# Patient Record
Sex: FEMALE | Race: WHITE | NOT HISPANIC OR LATINO | Employment: PART TIME | URBAN - METROPOLITAN AREA
[De-identification: names, ages, dates, MRNs, and addresses within clinical notes are randomized per-mention and may not be internally consistent; named-entity substitution may affect disease eponyms.]

---

## 2021-04-13 ENCOUNTER — HOSPITAL ENCOUNTER (EMERGENCY)
Age: 31
Discharge: HOME | End: 2021-04-13
Attending: EMERGENCY MEDICINE

## 2021-04-13 ENCOUNTER — APPOINTMENT (OUTPATIENT)
Dept: ULTRASOUND IMAGING | Age: 31
End: 2021-04-13
Attending: EMERGENCY MEDICINE

## 2021-04-13 VITALS
OXYGEN SATURATION: 100 % | WEIGHT: 136.24 LBS | HEART RATE: 65 BPM | DIASTOLIC BLOOD PRESSURE: 66 MMHG | TEMPERATURE: 97.9 F | RESPIRATION RATE: 16 BRPM | SYSTOLIC BLOOD PRESSURE: 125 MMHG

## 2021-04-13 DIAGNOSIS — R10.2 PELVIC PAIN IN FEMALE: Primary | ICD-10-CM

## 2021-04-13 LAB
ALBUMIN SERPL-MCNC: 4.1 G/DL (ref 3.6–5.1)
ALBUMIN/GLOB SERPL: 1.5 {RATIO} (ref 1–2.4)
ALP SERPL-CCNC: 30 UNITS/L (ref 45–117)
ALT SERPL-CCNC: 56 UNITS/L
ANION GAP SERPL CALC-SCNC: 8 MMOL/L (ref 10–20)
AST SERPL-CCNC: 110 UNITS/L
BASOPHILS # BLD: 0.1 K/MCL (ref 0–0.3)
BASOPHILS NFR BLD: 1 %
BILIRUB SERPL-MCNC: 0.3 MG/DL (ref 0.2–1)
BUN SERPL-MCNC: 15 MG/DL (ref 6–20)
BUN/CREAT SERPL: 17 (ref 7–25)
CALCIUM SERPL-MCNC: 8.7 MG/DL (ref 8.4–10.2)
CHLORIDE SERPL-SCNC: 109 MMOL/L (ref 98–107)
CLUE CELLS VAG QL WET PREP: NORMAL
CO2 SERPL-SCNC: 27 MMOL/L (ref 21–32)
CREAT SERPL-MCNC: 0.87 MG/DL (ref 0.51–0.95)
DEPRECATED RDW RBC: 41 FL (ref 39–50)
EOSINOPHIL # BLD: 0.1 K/MCL (ref 0–0.5)
EOSINOPHIL NFR BLD: 1 %
ERYTHROCYTE [DISTWIDTH] IN BLOOD: 11.7 % (ref 11–15)
FASTING DURATION TIME PATIENT: ABNORMAL H
GFR SERPLBLD BASED ON 1.73 SQ M-ARVRAT: 90 ML/MIN/1.73M2
GLOBULIN SER-MCNC: 2.8 G/DL (ref 2–4)
GLUCOSE SERPL-MCNC: 101 MG/DL (ref 65–99)
HCG UR QL: NEGATIVE
HCT VFR BLD CALC: 38.2 % (ref 36–46.5)
HGB BLD-MCNC: 12.8 G/DL (ref 12–15.5)
IMM GRANULOCYTES # BLD AUTO: 0 K/MCL (ref 0–0.2)
IMM GRANULOCYTES # BLD: 0 %
LYMPHOCYTES # BLD: 3.4 K/MCL (ref 1–4.8)
LYMPHOCYTES NFR BLD: 41 %
MCH RBC QN AUTO: 32 PG (ref 26–34)
MCHC RBC AUTO-ENTMCNC: 33.5 G/DL (ref 32–36.5)
MCV RBC AUTO: 95.5 FL (ref 78–100)
MONOCYTES # BLD: 0.6 K/MCL (ref 0.3–0.9)
MONOCYTES NFR BLD: 7 %
NEUTROPHILS # BLD: 4.2 K/MCL (ref 1.8–7.7)
NEUTROPHILS NFR BLD: 50 %
NRBC BLD MANUAL-RTO: 0 /100 WBC
PLATELET # BLD AUTO: 209 K/MCL (ref 140–450)
POTASSIUM SERPL-SCNC: 3.7 MMOL/L (ref 3.4–5.1)
PROT SERPL-MCNC: 6.9 G/DL (ref 6.4–8.2)
RBC # BLD: 4 MIL/MCL (ref 4–5.2)
SODIUM SERPL-SCNC: 140 MMOL/L (ref 135–145)
T VAGINALIS VAG QL WET PREP: NORMAL
TSH SERPL-ACNC: 2.64 MCUNITS/ML (ref 0.35–5)
WBC # BLD: 8.4 K/MCL (ref 4.2–11)
YEAST VAG QL WET PREP: NORMAL

## 2021-04-13 PROCEDURE — 87210 SMEAR WET MOUNT SALINE/INK: CPT | Performed by: PHYSICIAN ASSISTANT

## 2021-04-13 PROCEDURE — 87491 CHLMYD TRACH DNA AMP PROBE: CPT | Performed by: PHYSICIAN ASSISTANT

## 2021-04-13 PROCEDURE — 84443 ASSAY THYROID STIM HORMONE: CPT | Performed by: PHYSICIAN ASSISTANT

## 2021-04-13 PROCEDURE — 85025 COMPLETE CBC W/AUTO DIFF WBC: CPT | Performed by: EMERGENCY MEDICINE

## 2021-04-13 PROCEDURE — 84703 CHORIONIC GONADOTROPIN ASSAY: CPT

## 2021-04-13 PROCEDURE — 80053 COMPREHEN METABOLIC PANEL: CPT | Performed by: EMERGENCY MEDICINE

## 2021-04-13 PROCEDURE — 76856 US EXAM PELVIC COMPLETE: CPT

## 2021-04-13 PROCEDURE — 99284 EMERGENCY DEPT VISIT MOD MDM: CPT

## 2021-04-13 PROCEDURE — 76830 TRANSVAGINAL US NON-OB: CPT

## 2021-04-13 PROCEDURE — 36415 COLL VENOUS BLD VENIPUNCTURE: CPT

## 2021-04-13 ASSESSMENT — ENCOUNTER SYMPTOMS
BACK PAIN: 0
HEADACHES: 0
FEVER: 0
SHORTNESS OF BREATH: 0
ABDOMINAL PAIN: 1

## 2021-04-13 ASSESSMENT — PAIN SCALES - GENERAL: PAINLEVEL_OUTOF10: 4

## 2021-04-14 LAB
C TRACH RRNA CVX QL NAA+PROBE: NEGATIVE
Lab: NORMAL
N GONORRHOEA RRNA CVX QL NAA+PROBE: NEGATIVE
RAINBOW EXTRA TUBES HOLD SPECIMEN: NORMAL

## 2021-04-15 ENCOUNTER — OFFICE VISIT (OUTPATIENT)
Dept: OBGYN | Age: 31
End: 2021-04-15

## 2021-04-15 VITALS
WEIGHT: 137.2 LBS | DIASTOLIC BLOOD PRESSURE: 79 MMHG | SYSTOLIC BLOOD PRESSURE: 122 MMHG | HEIGHT: 66 IN | HEART RATE: 71 BPM | BODY MASS INDEX: 22.05 KG/M2

## 2021-04-15 DIAGNOSIS — Z30.432 ENCOUNTER FOR IUD REMOVAL: Primary | ICD-10-CM

## 2021-04-15 PROCEDURE — 58301 REMOVE INTRAUTERINE DEVICE: CPT | Performed by: LEGAL MEDICINE

## 2021-04-15 PROCEDURE — 99202 OFFICE O/P NEW SF 15 MIN: CPT | Performed by: LEGAL MEDICINE

## 2021-10-24 ENCOUNTER — WALK IN (OUTPATIENT)
Dept: URGENT CARE | Age: 31
End: 2021-10-24
Attending: FAMILY MEDICINE

## 2021-10-24 VITALS
DIASTOLIC BLOOD PRESSURE: 71 MMHG | WEIGHT: 128 LBS | TEMPERATURE: 97.5 F | BODY MASS INDEX: 20.66 KG/M2 | HEART RATE: 72 BPM | RESPIRATION RATE: 16 BRPM | OXYGEN SATURATION: 97 % | SYSTOLIC BLOOD PRESSURE: 113 MMHG

## 2021-10-24 DIAGNOSIS — S60.351A FOREIGN BODY OF RIGHT THUMB, INITIAL ENCOUNTER: Primary | ICD-10-CM

## 2021-10-24 PROCEDURE — 64450 NJX AA&/STRD OTHER PN/BRANCH: CPT

## 2021-10-24 PROCEDURE — 99212 OFFICE O/P EST SF 10 MIN: CPT

## 2021-10-24 PROCEDURE — 10002801 HB RX 250 W/O HCPCS: Performed by: FAMILY MEDICINE

## 2021-10-24 RX ORDER — LIDOCAINE HYDROCHLORIDE 10 MG/ML
100 INJECTION, SOLUTION EPIDURAL; INFILTRATION; INTRACAUDAL; PERINEURAL ONCE
Status: COMPLETED | OUTPATIENT
Start: 2021-10-24 | End: 2021-10-24

## 2021-10-24 RX ORDER — CEFADROXIL 500 MG/1
500 CAPSULE ORAL 2 TIMES DAILY
Qty: 14 CAPSULE | Refills: 0 | Status: SHIPPED | OUTPATIENT
Start: 2021-10-24 | End: 2021-10-31

## 2021-10-24 RX ADMIN — LIDOCAINE HYDROCHLORIDE 100 MG: 10 INJECTION, SOLUTION EPIDURAL; INFILTRATION; INTRACAUDAL; PERINEURAL at 10:57

## 2021-10-24 ASSESSMENT — PAIN SCALES - GENERAL
PAINLEVEL: 4
PAINLEVEL_OUTOF10: 4

## 2021-10-25 ASSESSMENT — ENCOUNTER SYMPTOMS
VOMITING: 0
NAUSEA: 0
ABDOMINAL PAIN: 0
NUMBNESS: 0
WEAKNESS: 0
FEVER: 0
ADENOPATHY: 0

## 2023-05-17 ENCOUNTER — TELEPHONE (OUTPATIENT)
Dept: OBGYN | Age: 33
End: 2023-05-17

## 2023-05-18 ENCOUNTER — OFFICE VISIT (OUTPATIENT)
Dept: OBGYN | Age: 33
End: 2023-05-18

## 2023-05-18 VITALS
HEART RATE: 71 BPM | BODY MASS INDEX: 19.37 KG/M2 | SYSTOLIC BLOOD PRESSURE: 120 MMHG | DIASTOLIC BLOOD PRESSURE: 78 MMHG | WEIGHT: 120 LBS

## 2023-05-18 DIAGNOSIS — N89.8 HYMENAL REMNANT: Primary | ICD-10-CM

## 2023-05-18 PROCEDURE — 99214 OFFICE O/P EST MOD 30 MIN: CPT | Performed by: OBSTETRICS & GYNECOLOGY

## 2023-10-14 ENCOUNTER — OFFICE VISIT (OUTPATIENT)
Dept: FAMILY MEDICINE CLINIC | Facility: CLINIC | Age: 33
End: 2023-10-14
Payer: MEDICAID

## 2023-10-14 ENCOUNTER — APPOINTMENT (OUTPATIENT)
Dept: GENERAL RADIOLOGY | Age: 33
End: 2023-10-14
Attending: EMERGENCY MEDICINE

## 2023-10-14 ENCOUNTER — HOSPITAL ENCOUNTER (EMERGENCY)
Age: 33
Discharge: HOME | End: 2023-10-14
Attending: EMERGENCY MEDICINE

## 2023-10-14 VITALS
SYSTOLIC BLOOD PRESSURE: 110 MMHG | BODY MASS INDEX: 20.09 KG/M2 | TEMPERATURE: 99 F | RESPIRATION RATE: 16 BRPM | HEIGHT: 66 IN | OXYGEN SATURATION: 99 % | HEART RATE: 67 BPM | WEIGHT: 125 LBS | DIASTOLIC BLOOD PRESSURE: 72 MMHG

## 2023-10-14 VITALS
OXYGEN SATURATION: 100 % | WEIGHT: 134.04 LBS | HEIGHT: 66 IN | HEART RATE: 68 BPM | TEMPERATURE: 98 F | RESPIRATION RATE: 16 BRPM | BODY MASS INDEX: 21.54 KG/M2 | SYSTOLIC BLOOD PRESSURE: 106 MMHG | DIASTOLIC BLOOD PRESSURE: 66 MMHG

## 2023-10-14 DIAGNOSIS — R55 SYNCOPE, UNSPECIFIED SYNCOPE TYPE: Primary | ICD-10-CM

## 2023-10-14 DIAGNOSIS — R55 SYNCOPE AND COLLAPSE: Primary | ICD-10-CM

## 2023-10-14 LAB
ALBUMIN SERPL-MCNC: 4.1 G/DL (ref 3.6–5.1)
ALBUMIN/GLOB SERPL: 1.5 {RATIO} (ref 1–2.4)
ALP SERPL-CCNC: 37 UNITS/L (ref 45–117)
ALT SERPL-CCNC: 19 UNITS/L
ANION GAP SERPL CALC-SCNC: 8 MMOL/L (ref 7–19)
AST SERPL-CCNC: 14 UNITS/L
BASOPHILS # BLD: 0.1 K/MCL (ref 0–0.3)
BASOPHILS NFR BLD: 1 %
BILIRUB SERPL-MCNC: 0.4 MG/DL (ref 0.2–1)
BUN SERPL-MCNC: 11 MG/DL (ref 6–20)
BUN/CREAT SERPL: 15 (ref 7–25)
CALCIUM SERPL-MCNC: 8.5 MG/DL (ref 8.4–10.2)
CHLORIDE SERPL-SCNC: 107 MMOL/L (ref 97–110)
CO2 SERPL-SCNC: 29 MMOL/L (ref 21–32)
CREAT SERPL-MCNC: 0.74 MG/DL (ref 0.51–0.95)
DEPRECATED RDW RBC: 42.3 FL (ref 39–50)
EGFRCR SERPLBLD CKD-EPI 2021: >90 ML/MIN/{1.73_M2}
EOSINOPHIL # BLD: 0 K/MCL (ref 0–0.5)
EOSINOPHIL NFR BLD: 0 %
ERYTHROCYTE [DISTWIDTH] IN BLOOD: 11.9 % (ref 11–15)
FASTING DURATION TIME PATIENT: ABNORMAL H
GLOBULIN SER-MCNC: 2.8 G/DL (ref 2–4)
GLUCOSE SERPL-MCNC: 102 MG/DL (ref 70–99)
HCG UR QL: NEGATIVE
HCT VFR BLD CALC: 39 % (ref 36–46.5)
HGB BLD-MCNC: 12.9 G/DL (ref 12–15.5)
IMM GRANULOCYTES # BLD AUTO: 0 K/MCL (ref 0–0.2)
IMM GRANULOCYTES # BLD: 0 %
LYMPHOCYTES # BLD: 2.5 K/MCL (ref 1–4.8)
LYMPHOCYTES NFR BLD: 28 %
MAGNESIUM SERPL-MCNC: 2.1 MG/DL (ref 1.7–2.4)
MCH RBC QN AUTO: 31.9 PG (ref 26–34)
MCHC RBC AUTO-ENTMCNC: 33.1 G/DL (ref 32–36.5)
MCV RBC AUTO: 96.5 FL (ref 78–100)
MONOCYTES # BLD: 0.5 K/MCL (ref 0.3–0.9)
MONOCYTES NFR BLD: 5 %
NEUTROPHILS # BLD: 6.1 K/MCL (ref 1.8–7.7)
NEUTROPHILS NFR BLD: 66 %
NRBC BLD MANUAL-RTO: 0 /100 WBC
PLATELET # BLD AUTO: 162 K/MCL (ref 140–450)
POTASSIUM SERPL-SCNC: 3.6 MMOL/L (ref 3.4–5.1)
PROT SERPL-MCNC: 6.9 G/DL (ref 6.4–8.2)
RAINBOW EXTRA TUBES HOLD SPECIMEN: NORMAL
RAINBOW EXTRA TUBES HOLD SPECIMEN: NORMAL
RBC # BLD: 4.04 MIL/MCL (ref 4–5.2)
SODIUM SERPL-SCNC: 140 MMOL/L (ref 135–145)
TROPONIN I SERPL DL<=0.01 NG/ML-MCNC: <4 NG/L
WBC # BLD: 9.2 K/MCL (ref 4.2–11)

## 2023-10-14 PROCEDURE — 80053 COMPREHEN METABOLIC PANEL: CPT | Performed by: EMERGENCY MEDICINE

## 2023-10-14 PROCEDURE — 36415 COLL VENOUS BLD VENIPUNCTURE: CPT

## 2023-10-14 PROCEDURE — 85025 COMPLETE CBC W/AUTO DIFF WBC: CPT | Performed by: EMERGENCY MEDICINE

## 2023-10-14 PROCEDURE — 84484 ASSAY OF TROPONIN QUANT: CPT | Performed by: EMERGENCY MEDICINE

## 2023-10-14 PROCEDURE — 93005 ELECTROCARDIOGRAM TRACING: CPT | Performed by: EMERGENCY MEDICINE

## 2023-10-14 PROCEDURE — 84703 CHORIONIC GONADOTROPIN ASSAY: CPT

## 2023-10-14 PROCEDURE — 10002807 HB RX 258: Performed by: EMERGENCY MEDICINE

## 2023-10-14 PROCEDURE — 71045 X-RAY EXAM CHEST 1 VIEW: CPT

## 2023-10-14 PROCEDURE — 99284 EMERGENCY DEPT VISIT MOD MDM: CPT

## 2023-10-14 PROCEDURE — 83735 ASSAY OF MAGNESIUM: CPT | Performed by: EMERGENCY MEDICINE

## 2023-10-14 RX ADMIN — SODIUM CHLORIDE 1000 ML: 9 INJECTION, SOLUTION INTRAVENOUS at 14:08

## 2023-10-14 ASSESSMENT — ENCOUNTER SYMPTOMS
VOMITING: 0
ABDOMINAL PAIN: 0
FACIAL ASYMMETRY: 0
FEVER: 0
WEAKNESS: 0
NAUSEA: 0
DIARRHEA: 0
HEADACHES: 0
CHILLS: 0
NUMBNESS: 0
SEIZURES: 0
SPEECH DIFFICULTY: 0
SHORTNESS OF BREATH: 0
DIZZINESS: 0
LIGHT-HEADEDNESS: 1
TREMORS: 0

## 2023-10-14 ASSESSMENT — PAIN SCALES - GENERAL: PAINLEVEL_OUTOF10: 0

## 2023-10-15 LAB
ATRIAL RATE (BPM): 72
P AXIS (DEGREES): 32
PR-INTERVAL (MSEC): 132
QRS-INTERVAL (MSEC): 88
QT-INTERVAL (MSEC): 410
QTC: 449
R AXIS (DEGREES): 68
REPORT TEXT: NORMAL
T AXIS (DEGREES): 39
VENTRICULAR RATE EKG/MIN (BPM): 72

## 2024-03-12 ENCOUNTER — OFFICE VISIT (OUTPATIENT)
Dept: FAMILY MEDICINE CLINIC | Facility: CLINIC | Age: 34
End: 2024-03-12

## 2024-03-12 ENCOUNTER — OFFICE VISIT (OUTPATIENT)
Dept: FAMILY MEDICINE CLINIC | Facility: CLINIC | Age: 34
End: 2024-03-12
Payer: COMMERCIAL

## 2024-03-12 VITALS
OXYGEN SATURATION: 98 % | HEIGHT: 66 IN | DIASTOLIC BLOOD PRESSURE: 68 MMHG | SYSTOLIC BLOOD PRESSURE: 104 MMHG | BODY MASS INDEX: 20.57 KG/M2 | RESPIRATION RATE: 16 BRPM | TEMPERATURE: 98 F | HEART RATE: 67 BPM | WEIGHT: 128 LBS

## 2024-03-12 DIAGNOSIS — Z23 NEED FOR TDAP VACCINATION: Primary | ICD-10-CM

## 2024-03-12 DIAGNOSIS — Z02.1 PHYSICAL EXAM, PRE-EMPLOYMENT: Primary | ICD-10-CM

## 2024-03-12 DIAGNOSIS — Z11.1 PPD SCREENING TEST: ICD-10-CM

## 2024-03-12 PROCEDURE — 86580 TB INTRADERMAL TEST: CPT | Performed by: NURSE PRACTITIONER

## 2024-03-12 PROCEDURE — 90715 TDAP VACCINE 7 YRS/> IM: CPT | Performed by: NURSE PRACTITIONER

## 2024-03-12 PROCEDURE — 99395 PREV VISIT EST AGE 18-39: CPT | Performed by: NURSE PRACTITIONER

## 2024-03-12 PROCEDURE — 90471 IMMUNIZATION ADMIN: CPT | Performed by: NURSE PRACTITIONER

## 2024-03-12 NOTE — PATIENT INSTRUCTIONS
You will need to return to clinic in 48-72 hours to have results of TB test read.     Please return to clinic on 3/14/24 after 9:00AM or on 3/15/24 before 9:00AM to have your TB test read.    If you do not return during this time your test will need to be repeated.     Our clinic hours are:  Monday-Friday        8:00 am to 7:30 pm  Saturday/Sunday    9:00 am to 4:30 pm    You can go to any of the [facility] Walk-In Clinics to have your TB test read.  To find your nearest Walk-In Clinic go to [email or web address]

## 2024-03-12 NOTE — PROGRESS NOTES
CHIEF COMPLAINT:         HPI:   Desi Rodriguez is a 33 year old female who presents for a complete physical exam.     Patient concerns: none.  Activity tolerance: normal      No current outpatient medications on file.      History reviewed. No pertinent past medical history.   History reviewed. No pertinent surgical history.   No family history on file.   Social History:  Social History     Socioeconomic History    Marital status: Single   Tobacco Use    Smoking status: Former     Types: Cigarettes    Smokeless tobacco: Never   Vaping Use    Vaping Use: Some days      Occ: . : single. Children: 1.   Exercise:  7 times per week.  Diet: watches calories closely     REVIEW OF SYSTEMS:   GENERAL: Feels well otherwise  SKIN: denies any unusual skin lesions  EYES:denies blurred vision or double vision  HEENT: denies nasal congestion, sinus pain or ST  LUNGS: denies shortness of breath at rest on on exertion  CARDIOVASCULAR: denies chest pain or palpitations   GI: denies abdominal pain or heartburn.  No N/V/C/D.  : denies vaginal discharge, dysuria, suprapubic pain.   MUSCULOSKELETAL: denies back pain or other joint pain  NEURO: denies headaches  PSYCHE: denies depression or anxiety  HEMATOLOGIC: denies hx of anemia or other bleeding disorders  ENDOCRINE: denies thyroid history  ALLERGY/ASTHMA: denies hx of seasonal allergies or asthma    EXAM:   /68   Pulse 67   Temp 97.7 °F (36.5 °C)   Resp 16   Ht 5' 6\" (1.676 m)   Wt 128 lb (58.1 kg)   LMP 03/01/2024   SpO2 98%   BMI 20.66 kg/m²   Body mass index is 20.66 kg/m².     GENERAL: well developed, well nourished,in no apparent distress  SKIN: no rashes, no suspicious lesions  HEENT: atraumatic, normocephalic,ears and throat are clear  EYES:PERRLA, EOMI, conjunctiva are clear  NECK: supple,no adenopathy,no bruits  CHEST: no chest tenderness  LUNGS: Clear to auscultation bilaterally. No diminished breath sounds. No wheezing, rhonchi,  or rales.  CARDIO: RRR without murmur  GI: BS's present x4., No tenderness of palpation.  No obvious masses or palpable organomegaly   MUSCULOSKELETAL: back is not tender, ROM of the back fully intact  EXTREMITIES: no cyanosis, clubbing or edema  NEURO: Alert & Oriented x3. Cranial nerves are grossly intact.       ASSESSMENT AND PLAN:   Desi oRdriguez is a 33 year old female who presents for a pre employment  physical exam. Patient's current BMI is Body mass index is 20.66 kg/m²..      ASSESSMENT: 1. Physical exam, pre-employment        PLAN: Encouraged patient to have full physical with health maintenance labs done by PCP within this year.  Nutrition counseling provided.     Form filled out and given to patient.  Form was copied and sent to scanning.     Patient's questions/concerns were addressed and answered. Patient is in agreement with treatment plan.     Patient is to follow up as needed with PCP or here in clinic.

## 2024-03-12 NOTE — PROGRESS NOTES
Here for Tdap and Tb screening.  She will get her vaccine record for her MMR      See TB screening flowsheet.  Tb placed.      Vaccination Screening Questionnaire filled out by patient and reviewed by myself.  No contraindications to vaccination.  Vaccination information sheet given to patient. Side effects and risks of vaccination explained and discussed.  Patient voiced understanding and agreed to proceed with vaccination.  Pt tolerated vaccine well.

## 2024-03-13 ENCOUNTER — OFFICE VISIT (OUTPATIENT)
Dept: FAMILY MEDICINE CLINIC | Facility: CLINIC | Age: 34
End: 2024-03-13
Payer: COMMERCIAL

## 2024-03-13 VITALS
DIASTOLIC BLOOD PRESSURE: 70 MMHG | RESPIRATION RATE: 16 BRPM | BODY MASS INDEX: 21.38 KG/M2 | TEMPERATURE: 97 F | WEIGHT: 133 LBS | SYSTOLIC BLOOD PRESSURE: 108 MMHG | HEART RATE: 66 BPM | HEIGHT: 66 IN

## 2024-03-13 DIAGNOSIS — R42 ORTHOSTATIC DIZZINESS: ICD-10-CM

## 2024-03-13 DIAGNOSIS — R55 SYNCOPE AND COLLAPSE: Primary | ICD-10-CM

## 2024-03-13 DIAGNOSIS — Z82.49 FAMILY HISTORY OF CARDIOMYOPATHY: ICD-10-CM

## 2024-03-13 DIAGNOSIS — Z01.84 IMMUNITY STATUS TESTING: ICD-10-CM

## 2024-03-13 DIAGNOSIS — R42 DIZZINESS: ICD-10-CM

## 2024-03-13 PROCEDURE — 3074F SYST BP LT 130 MM HG: CPT | Performed by: STUDENT IN AN ORGANIZED HEALTH CARE EDUCATION/TRAINING PROGRAM

## 2024-03-13 PROCEDURE — 99204 OFFICE O/P NEW MOD 45 MIN: CPT | Performed by: STUDENT IN AN ORGANIZED HEALTH CARE EDUCATION/TRAINING PROGRAM

## 2024-03-13 PROCEDURE — 3078F DIAST BP <80 MM HG: CPT | Performed by: STUDENT IN AN ORGANIZED HEALTH CARE EDUCATION/TRAINING PROGRAM

## 2024-03-13 PROCEDURE — 3008F BODY MASS INDEX DOCD: CPT | Performed by: STUDENT IN AN ORGANIZED HEALTH CARE EDUCATION/TRAINING PROGRAM

## 2024-03-13 NOTE — PROGRESS NOTES
[facility] Family Medicine Note  03/13/24    Chief complaint:   Chief Complaint   Patient presents with    [facility]     HPI:   Desi Rodriguez is a 33 year old female who presents to [facility].    Went to walk in clinic for work physical. Would like MMR titer done.    Will be  for age 3 for a .    Used to work in school.    No recent illness.    No chest pain or shortness of breath. No headaches.    Occasional dizziness. Hx of syncope for years. Would last 30 seconds, would be rigid and fall to the ground. Went to ER and told not a seizure. She cannot move for the 30 seconds and feels shaky.  One time fell down the stairs. Has thought it has not happened.     Had appointment with cardiologist scheduled but insurance was cut.    If sitting up too quickly feels slightly lightheaded as well.    She is a long distance runner. No issues when running. Sometimes atypical chest pain.     Last syncopal episode was the morning after having a lot of drinks the night before. This is not the typical case.     Her sister is a doctor.    PMH: denied  PSH: knee surgery  Meds: denied  Allergies: allergies  Home: lives with son age 5  Work: will be starting new job  Habits: Denied alcohol, tobacco, or drug use  FHx: father has cardiomyopathy  Immunizations: got childhood immunizations in [place]      Wt Readings from Last 6 Encounters:   03/13/24 133 lb (60.3 kg)   03/12/24 128 lb (58.1 kg)   10/14/23 125 lb (56.7 kg)       History reviewed. No pertinent past medical history.  History reviewed. No pertinent surgical history.  No Known Allergies  No outpatient medications have been marked as taking for the 3/13/24 encounter (Office Visit) with Xochitl Reddy MD.     Social History     Socioeconomic History    Marital status: Single   Tobacco Use    Smoking status: Former     Types: Cigarettes    Smokeless tobacco: Never   Vaping Use    Vaping Use: Some days   Substance and  Sexual Activity    Alcohol use: Yes     Comment: rarely     Counseling given: Not Answered    History reviewed. No pertinent family history.  No family status information on file.        REVIEW OF SYSTEMS:   ROS: see HPI    EXAM:   /70 (BP Location: Right arm, Patient Position: Sitting, Cuff Size: adult)   Pulse 66   Temp 97.3 °F (36.3 °C) (Temporal)   Resp 16   Ht 5' 6\" (1.676 m)   Wt 133 lb (60.3 kg)   LMP 03/01/2024 (Exact Date)   BMI 21.47 kg/m²  Estimated body mass index is 21.47 kg/m² as calculated from the following:    Height as of this encounter: 5' 6\" (1.676 m).    Weight as of this encounter: 133 lb (60.3 kg).   Vital signs reviewed. Appears stated age, well groomed.  Physical Exam:  GEN:  Patient is alert and oriented x3, no apparent distress  HEAD:  Normocephalic, atraumatic  HEENT:  no scleral icterus, conjunctivae clear bilaterally, EOMI, PERRLA, OP clear  LUNGS: clear to auscultation bilaterally, no rales/rhonchi/wheezing  HEART:  Regular rate and rhythm, normal S1/S2, no murmurs, rubs or gallops  ABDOMEN:  Bowel sounds normal, soft, nondistended, nontender, no hepatosplenomegaly  EXTREMITIES:  Moves all extremities well  NEURO:  CN 2 - 12 grossly intact, gait normal      ASSESSMENT AND PLAN:   1. Syncope and collapse  2. Dizziness  Patient presents for hx of multiple syncopal episodes that have self resolved, these have happened intermittently over many years. Sought ER evaluation Fall 2023 and was going to follow up with cardiology then insurance dropped. Will start workup with echo to evaluate for structural abnormalities due to hx of cardiomyopathy in father. Will check holter to evaluate for arrhythmia since this may be preceding syncopal episodes. Will check labs to assess for organic causes. To ER if this recurs. Will also refer to cardiology. Follow up pending results.   - CARD ECHO 2D DOPPLER (CPT=93306); Future  - CARD MONITOR HOLTER ZIO 8-14 DAYS (CPT=93246/51151); Future  -  Cardio Referral - Internal  - CBC With Differential With Platelet; Future  - Comp Metabolic Panel (14); Future  - TSH W Reflex To Free T4; Future  - Lipid Panel; Future  - Urinalysis with Culture Reflex; Future    3. Family history of cardiomyopathy  - CARD ECHO 2D DOPPLER (CPT=93306); Future  - Cardio Referral - Internal    4. Orthostatic dizziness  - CBC With Differential With Platelet; Future  - Comp Metabolic Panel (14); Future  - TSH W Reflex To Free T4; Future  - Lipid Panel; Future  - Urinalysis with Culture Reflex; Future    5. Immunity status testing  Patient needs MMR titer for  job form  - MMR Panel([place] Immunity Panel); Future        Meds & Refills for this Visit:  Requested Prescriptions      No prescriptions requested or ordered in this encounter           Health Maintenance:  Health Maintenance Due   Topic Date Due    Pap Smear  Never done    COVID-19 Vaccine (1 - 2023-24 season) Never done    Influenza Vaccine (1) Never done    Annual Depression Screening  Never done       Patient/Caregiver Education: There are no barriers to learning. Medical education done.   Outcome: Patient verbalizes understanding. Patient is notified to call with any questions, complications, allergies, or worsening or changing symptoms.  Patient is to call with any side effects or complications from the treatments as a result of today.     Problem List:  There is no problem list on file for this patient.      No follow-ups on file. Pending results.     Xochitl Reddy MD  [facility] ([place], [place]) Family Medicine  03/13/24      Please note that portions of this note may have been completed with a voice recognition program. Efforts were made to edit the dictations but occasionally words are mis-transcribed. Thank you for your understanding.

## 2024-03-14 ENCOUNTER — LAB ENCOUNTER (OUTPATIENT)
Dept: LAB | Age: 34
End: 2024-03-14
Attending: STUDENT IN AN ORGANIZED HEALTH CARE EDUCATION/TRAINING PROGRAM
Payer: COMMERCIAL

## 2024-03-14 ENCOUNTER — OFFICE VISIT (OUTPATIENT)
Dept: FAMILY MEDICINE CLINIC | Facility: CLINIC | Age: 34
End: 2024-03-14
Payer: COMMERCIAL

## 2024-03-14 DIAGNOSIS — R55 SYNCOPE AND COLLAPSE: ICD-10-CM

## 2024-03-14 DIAGNOSIS — Z11.1 ENCOUNTER FOR READING OF TUBERCULIN SKIN TEST: Primary | ICD-10-CM

## 2024-03-14 DIAGNOSIS — R42 ORTHOSTATIC DIZZINESS: ICD-10-CM

## 2024-03-14 DIAGNOSIS — R42 DIZZINESS: ICD-10-CM

## 2024-03-14 DIAGNOSIS — Z01.84 IMMUNITY STATUS TESTING: ICD-10-CM

## 2024-03-14 LAB
ALBUMIN SERPL-MCNC: 4.4 G/DL (ref 3.4–5)
ALBUMIN/GLOB SERPL: 1.5 {RATIO} (ref 1–2)
ALP LIVER SERPL-CCNC: 37 U/L
ALT SERPL-CCNC: 32 U/L
ANION GAP SERPL CALC-SCNC: 6 MMOL/L (ref 0–18)
AST SERPL-CCNC: 19 U/L (ref 15–37)
BASOPHILS # BLD AUTO: 0.15 X10(3) UL (ref 0–0.2)
BASOPHILS NFR BLD AUTO: 2.2 %
BILIRUB SERPL-MCNC: 0.6 MG/DL (ref 0.1–2)
BILIRUB UR QL STRIP.AUTO: NEGATIVE
BUN BLD-MCNC: 17 MG/DL (ref 9–23)
CALCIUM BLD-MCNC: 9.3 MG/DL (ref 8.5–10.1)
CHLORIDE SERPL-SCNC: 108 MMOL/L (ref 98–112)
CHOLEST SERPL-MCNC: 130 MG/DL (ref ?–200)
CLARITY UR REFRACT.AUTO: CLEAR
CO2 SERPL-SCNC: 23 MMOL/L (ref 21–32)
CREAT BLD-MCNC: 1.23 MG/DL
EGFRCR SERPLBLD CKD-EPI 2021: 60 ML/MIN/1.73M2 (ref 60–?)
EOSINOPHIL # BLD AUTO: 0.05 X10(3) UL (ref 0–0.7)
EOSINOPHIL NFR BLD AUTO: 0.7 %
ERYTHROCYTE [DISTWIDTH] IN BLOOD BY AUTOMATED COUNT: 12.4 %
FASTING PATIENT LIPID ANSWER: YES
FASTING STATUS PATIENT QL REPORTED: YES
GLOBULIN PLAS-MCNC: 3 G/DL (ref 2.8–4.4)
GLUCOSE BLD-MCNC: 73 MG/DL (ref 70–99)
GLUCOSE UR STRIP.AUTO-MCNC: NORMAL MG/DL
HCT VFR BLD AUTO: 39 %
HDLC SERPL-MCNC: 51 MG/DL (ref 40–59)
HGB BLD-MCNC: 12.6 G/DL
IMM GRANULOCYTES # BLD AUTO: 0.02 X10(3) UL (ref 0–1)
IMM GRANULOCYTES NFR BLD: 0.3 %
INDURATION (): 0 MM (ref 0–11)
KETONES UR STRIP.AUTO-MCNC: NEGATIVE MG/DL
LDLC SERPL CALC-MCNC: 69 MG/DL (ref ?–100)
LEUKOCYTE ESTERASE UR QL STRIP.AUTO: NEGATIVE
LYMPHOCYTES # BLD AUTO: 2.08 X10(3) UL (ref 1–4)
LYMPHOCYTES NFR BLD AUTO: 31 %
MCH RBC QN AUTO: 31 PG (ref 26–34)
MCHC RBC AUTO-ENTMCNC: 32.3 G/DL (ref 31–37)
MCV RBC AUTO: 95.8 FL
MONOCYTES # BLD AUTO: 0.46 X10(3) UL (ref 0.1–1)
MONOCYTES NFR BLD AUTO: 6.9 %
NEUTROPHILS # BLD AUTO: 3.94 X10 (3) UL (ref 1.5–7.7)
NEUTROPHILS # BLD AUTO: 3.94 X10(3) UL (ref 1.5–7.7)
NEUTROPHILS NFR BLD AUTO: 58.9 %
NITRITE UR QL STRIP.AUTO: NEGATIVE
NONHDLC SERPL-MCNC: 79 MG/DL (ref ?–130)
OSMOLALITY SERPL CALC.SUM OF ELEC: 284 MOSM/KG (ref 275–295)
PH UR STRIP.AUTO: 5 [PH] (ref 5–8)
PLATELET # BLD AUTO: 253 10(3)UL (ref 150–450)
POTASSIUM SERPL-SCNC: 3.9 MMOL/L (ref 3.5–5.1)
PROT SERPL-MCNC: 7.4 G/DL (ref 6.4–8.2)
PROT UR STRIP.AUTO-MCNC: NEGATIVE MG/DL
RBC # BLD AUTO: 4.07 X10(6)UL
RBC UR QL AUTO: NEGATIVE
RUBV IGG SER QL: POSITIVE
RUBV IGG SER-ACNC: 75.1 IU/ML (ref 10–?)
SODIUM SERPL-SCNC: 137 MMOL/L (ref 136–145)
SP GR UR STRIP.AUTO: 1.01 (ref 1–1.03)
TRIGL SERPL-MCNC: 41 MG/DL (ref 30–149)
TSI SER-ACNC: 0.98 MIU/ML (ref 0.36–3.74)
UROBILINOGEN UR STRIP.AUTO-MCNC: NORMAL MG/DL
VLDLC SERPL CALC-MCNC: 6 MG/DL (ref 0–30)
WBC # BLD AUTO: 6.7 X10(3) UL (ref 4–11)

## 2024-03-14 PROCEDURE — 84443 ASSAY THYROID STIM HORMONE: CPT

## 2024-03-14 PROCEDURE — 86735 MUMPS ANTIBODY: CPT

## 2024-03-14 PROCEDURE — 86765 RUBEOLA ANTIBODY: CPT

## 2024-03-14 PROCEDURE — 80053 COMPREHEN METABOLIC PANEL: CPT

## 2024-03-14 PROCEDURE — 85025 COMPLETE CBC W/AUTO DIFF WBC: CPT

## 2024-03-14 PROCEDURE — 86762 RUBELLA ANTIBODY: CPT

## 2024-03-14 PROCEDURE — 80061 LIPID PANEL: CPT

## 2024-03-14 PROCEDURE — 81003 URINALYSIS AUTO W/O SCOPE: CPT

## 2024-03-18 DIAGNOSIS — R79.89 ELEVATED SERUM CREATININE: Primary | ICD-10-CM

## 2024-03-18 LAB
MEV IGG SER-ACNC: 208 AU/ML (ref 16.5–?)
MUV IGG SER IA-ACNC: 43.8 AU/ML (ref 11–?)

## 2024-06-03 ENCOUNTER — OFFICE VISIT (OUTPATIENT)
Dept: FAMILY MEDICINE CLINIC | Facility: CLINIC | Age: 34
End: 2024-06-03
Payer: COMMERCIAL

## 2024-06-03 VITALS
HEIGHT: 66 IN | BODY MASS INDEX: 20.89 KG/M2 | DIASTOLIC BLOOD PRESSURE: 78 MMHG | HEART RATE: 71 BPM | WEIGHT: 130 LBS | SYSTOLIC BLOOD PRESSURE: 104 MMHG | RESPIRATION RATE: 16 BRPM | OXYGEN SATURATION: 99 % | TEMPERATURE: 98 F

## 2024-06-03 DIAGNOSIS — B08.1 MOLLUSCUM CONTAGIOSUM INFECTION: Primary | ICD-10-CM

## 2024-06-03 RX ORDER — KETOCONAZOLE 20 MG/G
1 CREAM TOPICAL 2 TIMES DAILY
Qty: 15 G | Refills: 0 | Status: SHIPPED | OUTPATIENT
Start: 2024-06-03 | End: 2024-06-17

## 2024-06-03 NOTE — PATIENT INSTRUCTIONS
Please use small amount of Ketoconazole cream to the area twice daily for up to 14 days. Wash with soap/water and change out towels after use  Avoid staying in sweat enclosing clothing for long period of time. You may use a wipe after running and change quickly out of damp clothing.  Follow up with dermatology as discussed for treatment and removal. Follow up with ob/gyne annually as discussed.          [facility]-offices in [place]    [email or web address]  [phone]

## 2024-07-17 ENCOUNTER — TELEPHONE (OUTPATIENT)
Dept: OBGYN CLINIC | Facility: CLINIC | Age: 34
End: 2024-07-17

## 2024-07-17 NOTE — TELEPHONE ENCOUNTER
Patient scheduled appointment via [employer] today with Dr. Vega. Patient reports first day of last period 6/13. Patient informed of scheduling process for prenatal patients. Patient resides in [place], though Dr. Vega delivered at [place]. Information given about group and deliveries at [facility]. Patient states she will call us back to scheduled RN Education if interested in establishing care with our office. Voiced understanding and agreed.

## 2024-07-26 ENCOUNTER — LAB ENCOUNTER (OUTPATIENT)
Dept: LAB | Age: 34
End: 2024-07-26
Payer: COMMERCIAL

## 2024-07-26 ENCOUNTER — OFFICE VISIT (OUTPATIENT)
Dept: OBGYN CLINIC | Facility: CLINIC | Age: 34
End: 2024-07-26
Payer: COMMERCIAL

## 2024-07-26 VITALS
HEIGHT: 66 IN | HEART RATE: 56 BPM | WEIGHT: 144.19 LBS | DIASTOLIC BLOOD PRESSURE: 64 MMHG | SYSTOLIC BLOOD PRESSURE: 99 MMHG | BODY MASS INDEX: 23.17 KG/M2

## 2024-07-26 DIAGNOSIS — O20.9 VAGINAL BLEEDING AFFECTING EARLY PREGNANCY (HCC): Primary | ICD-10-CM

## 2024-07-26 DIAGNOSIS — O20.9 VAGINAL BLEEDING AFFECTING EARLY PREGNANCY (HCC): ICD-10-CM

## 2024-07-26 DIAGNOSIS — R42 DIZZY: ICD-10-CM

## 2024-07-26 DIAGNOSIS — O36.80X0 PREGNANCY WITH INCONCLUSIVE FETAL VIABILITY, SINGLE OR UNSPECIFIED FETUS (HCC): Primary | ICD-10-CM

## 2024-07-26 LAB
ANTIBODY SCREEN: NEGATIVE
B-HCG SERPL-ACNC: 90.4 MIU/ML
BASOPHILS # BLD AUTO: 0.06 X10(3) UL (ref 0–0.2)
BASOPHILS NFR BLD AUTO: 0.7 %
EOSINOPHIL # BLD AUTO: 0.06 X10(3) UL (ref 0–0.7)
EOSINOPHIL NFR BLD AUTO: 0.7 %
ERYTHROCYTE [DISTWIDTH] IN BLOOD BY AUTOMATED COUNT: 12.2 %
HCT VFR BLD AUTO: 37.1 %
HGB BLD-MCNC: 12.4 G/DL
IMM GRANULOCYTES # BLD AUTO: 0.03 X10(3) UL (ref 0–1)
IMM GRANULOCYTES NFR BLD: 0.4 %
LYMPHOCYTES # BLD AUTO: 2.25 X10(3) UL (ref 1–4)
LYMPHOCYTES NFR BLD AUTO: 27 %
MCH RBC QN AUTO: 32 PG (ref 26–34)
MCHC RBC AUTO-ENTMCNC: 33.4 G/DL (ref 31–37)
MCV RBC AUTO: 95.6 FL
MONOCYTES # BLD AUTO: 0.57 X10(3) UL (ref 0.1–1)
MONOCYTES NFR BLD AUTO: 6.9 %
NEUTROPHILS # BLD AUTO: 5.35 X10 (3) UL (ref 1.5–7.7)
NEUTROPHILS # BLD AUTO: 5.35 X10(3) UL (ref 1.5–7.7)
NEUTROPHILS NFR BLD AUTO: 64.3 %
PLATELET # BLD AUTO: 186 10(3)UL (ref 150–450)
PROGEST SERPL-MCNC: 1.19 NG/ML
RBC # BLD AUTO: 3.88 X10(6)UL
RH BLOOD TYPE: NEGATIVE
WBC # BLD AUTO: 8.3 X10(3) UL (ref 4–11)

## 2024-07-26 PROCEDURE — 99203 OFFICE O/P NEW LOW 30 MIN: CPT

## 2024-07-26 PROCEDURE — 85025 COMPLETE CBC W/AUTO DIFF WBC: CPT

## 2024-07-26 PROCEDURE — 86900 BLOOD TYPING SEROLOGIC ABO: CPT

## 2024-07-26 PROCEDURE — 3074F SYST BP LT 130 MM HG: CPT

## 2024-07-26 PROCEDURE — 84702 CHORIONIC GONADOTROPIN TEST: CPT

## 2024-07-26 PROCEDURE — 86901 BLOOD TYPING SEROLOGIC RH(D): CPT

## 2024-07-26 PROCEDURE — 3078F DIAST BP <80 MM HG: CPT

## 2024-07-26 PROCEDURE — 3008F BODY MASS INDEX DOCD: CPT

## 2024-07-26 PROCEDURE — 86850 RBC ANTIBODY SCREEN: CPT

## 2024-07-26 PROCEDURE — 84144 ASSAY OF PROGESTERONE: CPT

## 2024-07-26 RX ORDER — CHOLECALCIFEROL (VITAMIN D3) 25 MCG
1 TABLET,CHEWABLE ORAL DAILY
COMMUNITY

## 2024-07-26 NOTE — PROGRESS NOTES
Desi Rodriguez is a 33 year old female  Patient's last menstrual period was 2024 (exact date).   Chief Complaint   Patient presents with    Gyn Problem     Patient reports she is about 6w 1d LMP:24(Irregular)  Spotting that started 2 days ago, first a pinkish-brown\" tissue like discharge\".Started cramping a little last night, this morning cramping with bright red blood with \"tissue in the toilet\". Patient reports dizziness last night.    Patient would like to dicuss current medications   .    OBSTETRICS HISTORY:  OB History    Para Term  AB Living   2 1 1     1   SAB IAB Ectopic Multiple Live Births           1      # Outcome Date GA Lbr Herb/2nd Weight Sex Type Anes PTL Lv   2 Current            1 Term 10/10/18 39w0d  7 lb 11 oz (3.487 kg) M NORMAL SPONT   RACHEL       GYNE HISTORY:  Periods regular monthly    History   Sexual Activity    Sexual activity: Not on file        Hx Prior Abnormal Pap: Yes  Pap Date:  (per patient over a year ago)  Pap Result Notes:  (negative per patient)        MEDICAL HISTORY:  Past Medical History:    HSV-1 infection       SURGICAL HISTORY:  Past Surgical History:   Procedure Laterality Date    Knee scope,med or lat menis repair Right 2006       SOCIAL HISTORY:  Social History     Socioeconomic History    Marital status: Single     Spouse name: Not on file    Number of children: Not on file    Years of education: Not on file    Highest education level: Not on file   Occupational History    Not on file   Tobacco Use    Smoking status: Former     Types: Cigarettes    Smokeless tobacco: Never   Vaping Use    Vaping status: Some Days   Substance and Sexual Activity    Alcohol use: Not Currently     Comment: rarely    Drug use: Never    Sexual activity: Not on file   Other Topics Concern    Not on file   Social History Narrative    Not on file     Social Determinants of Health     Financial Resource Strain: Not on file   Food Insecurity: Not on file    Transportation Needs: Not on file   Stress: Not on file   Housing Stability: Not on file       FAMILY HISTORY:  Family History   Problem Relation Age of Onset    Heart Disorder Father        MEDICATIONS:    Current Outpatient Medications:     prenatal vitamin with DHA 27-0.8-228 MG Oral Cap, Take 1 capsule by mouth daily., Disp: , Rfl:     Magnesium 100 MG Oral Tab, Take 1.2 tablets (120 mg total) by mouth., Disp: , Rfl:     ALLERGIES:  No Known Allergies      Review of Systems:  Constitutional:  Denies fatigue, night sweats, hot flashes  Eyes:  denies blurred or double vision  Cardiovascular:  denies chest pain or palpitations  Respiratory:  denies shortness of breath  Gastrointestinal:  denies heartburn, abdominal pain, diarrhea or constipation  Genitourinary:  denies dysuria, incontinence, abnormal vaginal discharge, vaginal itching  Musculoskeletal:  denies back pain.  Skin/Breast:  Denies any breast pain, lumps, or discharge.   Neurological:  denies headaches, extremity weakness or numbness.  Psychiatric: denies depression or anxiety.  Endocrine:   denies excessive thirst or urination.  Heme/Lymph:  denies history of anemia, easy bruising or bleeding.      PHYSICAL EXAM:   Constitutional: well developed, well nourished  Head/Face: normocephalic  Neck/Thyroid: thyroid symmetric, no thyromegaly, no nodules, no adenopathy  Lymphatic:no abnormal supraclavicular or axillary adenopathy is noted  Abdomen:  soft, nontender, nondistended, no masses  Skin/Hair: no unusual rashes or bruises  Extremities: no edema, no cyanosis  Psychiatric:  Oriented to time, place, person and situation. Appropriate mood and affect    Pelvic Exam:  External Genitalia: normal appearance, hair distribution, and no lesions  Urethral Meatus:  normal in size, location, without lesions and prolapse  Bladder:  No fullness, masses or tenderness  Vagina:  Normal appearance without lesions, no abnormal discharge, dark red blood pooling in vaginal  vault   Cervix:  Normal without tenderness on motion  Uterus: normal in size, contour, position, mobility, without tenderness  Adnexa: normal without masses or tenderness  Perineum: normal  Anus: no hemorroids     Assessment & Plan:  Diagnoses and all orders for this visit:    Vaginal bleeding affecting early pregnancy (HCC)  -     HCG, Beta Subunit, Quant; Future  -     Progesterone; Future  -     ABORH (Blood Type); Future  -     ANTIBODY SCREEN; Future    Dizzy  -     CBC W Differential W Platelet; Future

## 2024-07-29 ENCOUNTER — LAB ENCOUNTER (OUTPATIENT)
Dept: LAB | Age: 34
End: 2024-07-29
Payer: COMMERCIAL

## 2024-07-29 ENCOUNTER — NURSE ONLY (OUTPATIENT)
Dept: OBGYN CLINIC | Facility: CLINIC | Age: 34
End: 2024-07-29
Payer: COMMERCIAL

## 2024-07-29 ENCOUNTER — TELEPHONE (OUTPATIENT)
Dept: OBGYN CLINIC | Facility: CLINIC | Age: 34
End: 2024-07-29

## 2024-07-29 VITALS
SYSTOLIC BLOOD PRESSURE: 117 MMHG | HEART RATE: 60 BPM | DIASTOLIC BLOOD PRESSURE: 75 MMHG | WEIGHT: 144.63 LBS | HEIGHT: 66 IN | BODY MASS INDEX: 23.24 KG/M2

## 2024-07-29 DIAGNOSIS — O03.9 MISCARRIAGE (HCC): Primary | ICD-10-CM

## 2024-07-29 DIAGNOSIS — O26.899 RH NEGATIVE, ANTEPARTUM (HCC): ICD-10-CM

## 2024-07-29 DIAGNOSIS — Z67.91 RH NEGATIVE, ANTEPARTUM (HCC): ICD-10-CM

## 2024-07-29 DIAGNOSIS — O03.9 MISCARRIAGE (HCC): ICD-10-CM

## 2024-07-29 LAB — B-HCG SERPL-ACNC: 18.5 MIU/ML

## 2024-07-29 PROCEDURE — 84702 CHORIONIC GONADOTROPIN TEST: CPT

## 2024-07-29 NOTE — PROGRESS NOTES
Message left to call back for results.    HCG drawn this morning and rhogam given today in the [place] office.

## 2024-07-30 DIAGNOSIS — O03.9 MISCARRIAGE (HCC): Primary | ICD-10-CM

## 2024-07-30 NOTE — PROGRESS NOTES
Spoke with patient. Reviewed results and recommendations: HCG trending downward. Will repeat HCG in one week. Understanding verbalized.

## 2024-08-09 ENCOUNTER — MED REC SCAN ONLY (OUTPATIENT)
Dept: FAMILY MEDICINE CLINIC | Facility: CLINIC | Age: 34
End: 2024-08-09

## 2024-09-21 ENCOUNTER — HOSPITAL ENCOUNTER (EMERGENCY)
Facility: HOSPITAL | Age: 34
Discharge: HOME | End: 2024-09-21
Attending: EMERGENCY MEDICINE
Payer: COMMERCIAL

## 2024-09-21 ENCOUNTER — OFFICE VISIT (OUTPATIENT)
Dept: FAMILY MEDICINE CLINIC | Facility: CLINIC | Age: 34
End: 2024-09-21
Payer: COMMERCIAL

## 2024-09-21 ENCOUNTER — APPOINTMENT (OUTPATIENT)
Dept: ULTRASOUND IMAGING | Facility: HOSPITAL | Age: 34
End: 2024-09-21
Attending: EMERGENCY MEDICINE
Payer: COMMERCIAL

## 2024-09-21 VITALS
SYSTOLIC BLOOD PRESSURE: 115 MMHG | RESPIRATION RATE: 14 BRPM | OXYGEN SATURATION: 100 % | WEIGHT: 135 LBS | HEIGHT: 66 IN | DIASTOLIC BLOOD PRESSURE: 75 MMHG | BODY MASS INDEX: 21.69 KG/M2 | TEMPERATURE: 99 F | HEART RATE: 55 BPM

## 2024-09-21 VITALS
HEIGHT: 66 IN | BODY MASS INDEX: 21.69 KG/M2 | WEIGHT: 135 LBS | SYSTOLIC BLOOD PRESSURE: 112 MMHG | OXYGEN SATURATION: 98 % | RESPIRATION RATE: 16 BRPM | DIASTOLIC BLOOD PRESSURE: 80 MMHG | HEART RATE: 69 BPM | TEMPERATURE: 98 F

## 2024-09-21 DIAGNOSIS — R10.31 RIGHT INGUINAL PAIN: ICD-10-CM

## 2024-09-21 DIAGNOSIS — R19.00 ABDOMINAL SWELLING: Primary | ICD-10-CM

## 2024-09-21 DIAGNOSIS — Z87.59 H/O: 1 MISCARRIAGE: ICD-10-CM

## 2024-09-21 DIAGNOSIS — R10.2 PELVIC PAIN: Primary | ICD-10-CM

## 2024-09-21 DIAGNOSIS — R79.89 ELEVATED LFTS: ICD-10-CM

## 2024-09-21 LAB
ALBUMIN SERPL-MCNC: 4.3 G/DL (ref 3.2–4.8)
ALBUMIN/GLOB SERPL: 1.7 {RATIO} (ref 1–2)
ALP LIVER SERPL-CCNC: 30 U/L
ALT SERPL-CCNC: 95 U/L
ANION GAP SERPL CALC-SCNC: 5 MMOL/L (ref 0–18)
AST SERPL-CCNC: 95 U/L (ref ?–34)
B-HCG SERPL-ACNC: <2.6 MIU/ML
BASOPHILS # BLD AUTO: 0.05 X10(3) UL (ref 0–0.2)
BASOPHILS NFR BLD AUTO: 0.7 %
BILIRUB SERPL-MCNC: 0.6 MG/DL (ref 0.3–1.2)
BILIRUB UR QL STRIP.AUTO: NEGATIVE
BUN BLD-MCNC: 8 MG/DL (ref 9–23)
CALCIUM BLD-MCNC: 9.5 MG/DL (ref 8.7–10.4)
CHLORIDE SERPL-SCNC: 108 MMOL/L (ref 98–112)
CO2 SERPL-SCNC: 24 MMOL/L (ref 21–32)
COLOR UR AUTO: YELLOW
CREAT BLD-MCNC: 0.76 MG/DL
EGFRCR SERPLBLD CKD-EPI 2021: 105 ML/MIN/1.73M2 (ref 60–?)
EOSINOPHIL # BLD AUTO: 0.05 X10(3) UL (ref 0–0.7)
EOSINOPHIL NFR BLD AUTO: 0.7 %
ERYTHROCYTE [DISTWIDTH] IN BLOOD BY AUTOMATED COUNT: 12.6 %
GLOBULIN PLAS-MCNC: 2.5 G/DL (ref 2–3.5)
GLUCOSE BLD-MCNC: 97 MG/DL (ref 70–99)
GLUCOSE UR STRIP.AUTO-MCNC: NEGATIVE MG/DL
HCT VFR BLD AUTO: 38.3 %
HGB BLD-MCNC: 13.1 G/DL
IMM GRANULOCYTES # BLD AUTO: 0.02 X10(3) UL (ref 0–1)
IMM GRANULOCYTES NFR BLD: 0.3 %
KETONES UR STRIP.AUTO-MCNC: NEGATIVE MG/DL
LEUKOCYTE ESTERASE UR QL STRIP.AUTO: NEGATIVE
LYMPHOCYTES # BLD AUTO: 1.95 X10(3) UL (ref 1–4)
LYMPHOCYTES NFR BLD AUTO: 27.7 %
MCH RBC QN AUTO: 32.1 PG (ref 26–34)
MCHC RBC AUTO-ENTMCNC: 34.2 G/DL (ref 31–37)
MCV RBC AUTO: 93.9 FL
MONOCYTES # BLD AUTO: 0.46 X10(3) UL (ref 0.1–1)
MONOCYTES NFR BLD AUTO: 6.5 %
NEUTROPHILS # BLD AUTO: 4.52 X10 (3) UL (ref 1.5–7.7)
NEUTROPHILS # BLD AUTO: 4.52 X10(3) UL (ref 1.5–7.7)
NEUTROPHILS NFR BLD AUTO: 64.1 %
NITRITE UR QL STRIP.AUTO: NEGATIVE
OSMOLALITY SERPL CALC.SUM OF ELEC: 282 MOSM/KG (ref 275–295)
PH UR STRIP.AUTO: 7.5 [PH] (ref 5–8)
PLATELET # BLD AUTO: 180 10(3)UL (ref 150–450)
PLATELETS.RETICULATED NFR BLD AUTO: 3.1 % (ref 0–7)
POTASSIUM SERPL-SCNC: 4 MMOL/L (ref 3.5–5.1)
PROT SERPL-MCNC: 6.8 G/DL (ref 5.7–8.2)
PROT UR STRIP.AUTO-MCNC: NEGATIVE MG/DL
RBC # BLD AUTO: 4.08 X10(6)UL
RBC UR QL AUTO: NEGATIVE
SODIUM SERPL-SCNC: 137 MMOL/L (ref 136–145)
SP GR UR STRIP.AUTO: 1.02 (ref 1–1.03)
UROBILINOGEN UR STRIP.AUTO-MCNC: 0.2 MG/DL
WBC # BLD AUTO: 7.1 X10(3) UL (ref 4–11)

## 2024-09-21 PROCEDURE — 93975 VASCULAR STUDY: CPT | Performed by: EMERGENCY MEDICINE

## 2024-09-21 PROCEDURE — 84702 CHORIONIC GONADOTROPIN TEST: CPT | Performed by: EMERGENCY MEDICINE

## 2024-09-21 PROCEDURE — 76856 US EXAM PELVIC COMPLETE: CPT | Performed by: EMERGENCY MEDICINE

## 2024-09-21 PROCEDURE — 99284 EMERGENCY DEPT VISIT MOD MDM: CPT

## 2024-09-21 PROCEDURE — 96360 HYDRATION IV INFUSION INIT: CPT

## 2024-09-21 PROCEDURE — 99215 OFFICE O/P EST HI 40 MIN: CPT

## 2024-09-21 PROCEDURE — 99285 EMERGENCY DEPT VISIT HI MDM: CPT

## 2024-09-21 PROCEDURE — 3079F DIAST BP 80-89 MM HG: CPT

## 2024-09-21 PROCEDURE — 81001 URINALYSIS AUTO W/SCOPE: CPT | Performed by: EMERGENCY MEDICINE

## 2024-09-21 PROCEDURE — 85025 COMPLETE CBC W/AUTO DIFF WBC: CPT | Performed by: EMERGENCY MEDICINE

## 2024-09-21 PROCEDURE — 3008F BODY MASS INDEX DOCD: CPT

## 2024-09-21 PROCEDURE — 80053 COMPREHEN METABOLIC PANEL: CPT | Performed by: EMERGENCY MEDICINE

## 2024-09-21 PROCEDURE — 3074F SYST BP LT 130 MM HG: CPT

## 2024-09-21 NOTE — ED INITIAL ASSESSMENT (HPI)
Swelling and pain in right groin area  since 3 days . Patient had miscarriage  end of July . Seen in walk in clinic today  sent here for  ultrasound .  Her doctor is suspecting retained products  in the uterus.

## 2024-09-21 NOTE — PROGRESS NOTES
Desi Rodriguez is a 34 year old female who presents to [facility] with c/o lower abdominal swelling with pain that radiates to right groin area with tenderness. Patient reports symptoms started Thursday. Patient states she recently had a miscarriage in the end of July. Since then patient had her period which was at the end of August and ended 9/2/24. Patient reports period was longer and heavier than normal. Patient states she passed POC but never had an ultrasound afterwards to ensure full evacuation of POC. Discussed with patient severity of symptoms and the inability to rule out malignant etiologies associated in the [facility] setting. Limitations of the [facility] explained to patient regarding ability to perform required and necessary evaluation and treatments, such as: radiological imaging, EKG testing, laboratory testing, and IVF/medication administration.    Accompanied by: self  After triage, higher acuity of care was recommended to Desi Rodriguez today.   Rationale: Due to limitations of the [facility], patient is advised to go to Emergency Room for further evaluation and treatment.  Site recommendation: [facility]  Patient verbalized understanding of rationale for further evaluation and was stable upon discharge.  Vitals:    09/21/24 1007   BP: 112/80   Pulse: 69   Resp: 16   Temp: 97.7 °F (36.5 °C)   SpO2: 98%   Weight: 135 lb (61.2 kg)   Height: 5' 6\" (1.676 m)

## 2024-09-21 NOTE — DISCHARGE INSTRUCTIONS
your liver enzymes are mildly elevated.  Follow-up with your doctor for repeat labs and further evaluation.

## 2024-09-21 NOTE — ED PROVIDER NOTES
Patient Seen in: [facility] Emergency Department      History     Chief Complaint   Patient presents with    Abdomen/Flank Pain     Stated Complaint: h/o recent misscarriage, lower abdominal swelling and groin pain    Subjective:   HPI    34-year-old female presents with pain and swelling to her lower abdomen and groin area that started 3 to 4 days ago.  Patient states she had a miscarriage at the end of July when she was about 6 weeks pregnant.  She states she had a period in early September that was heavier than normal and she passed some blood clots.  Patient states she went to walk-in clinic today who sent her to the ED due to concerns about retained products.  Patient denies any vaginal bleeding since her period in early September.  She denies nausea or vomiting.  Denies vaginal discharge.  Denies urinary symptoms.  Denies fever or chills.    Objective:   Past Medical History:    HSV-1 infection              Past Surgical History:   Procedure Laterality Date    Knee scope,med or lat menis repair Right 2006                Social History     Socioeconomic History    Marital status: Single   Tobacco Use    Smoking status: Former     Types: Cigarettes    Smokeless tobacco: Never   Vaping Use    Vaping status: Some Days   Substance and Sexual Activity    Alcohol use: Not Currently     Comment: rarely    Drug use: Never              Review of Systems    Positive for stated Chief Complaint: Abdomen/Flank Pain    Other systems are as noted in HPI.  Constitutional and vital signs reviewed.      All other systems reviewed and negative except as noted above.    Physical Exam     ED Triage Vitals [09/21/24 1113]   /74   Pulse 69   Resp 18   Temp 98.7 °F (37.1 °C)   Temp src Temporal   SpO2 99 %   O2 Device None (Room air)       Current Vitals:   Vital Signs  BP: 112/72  Pulse: 55  Resp: 16  Temp: 98.7 °F (37.1 °C)  Temp src: Temporal  MAP (mmHg): 78    Oxygen Therapy  SpO2: 100 %  O2 Device: None (Room  air)            Physical Exam  Vitals and nursing note reviewed.   Constitutional:       Appearance: She is well-developed.   HENT:      Head: Normocephalic and atraumatic.      Mouth/Throat:      Mouth: Mucous membranes are moist.   Eyes:      General: No scleral icterus.  Cardiovascular:      Rate and Rhythm: Normal rate and regular rhythm.   Pulmonary:      Effort: Pulmonary effort is normal.      Breath sounds: Normal breath sounds.   Abdominal:      General: Bowel sounds are normal. There is no distension.      Palpations: Abdomen is soft.      Tenderness: There is abdominal tenderness. There is no guarding or rebound.      Comments: Suprapubic tenderness   Skin:     General: Skin is warm and dry.   Neurological:      General: No focal deficit present.      Mental Status: She is alert and oriented to person, place, and time.      Cranial Nerves: No cranial nerve deficit.      Motor: No weakness.   Psychiatric:         Mood and Affect: Mood normal.         Behavior: Behavior normal.               ED Course     Labs Reviewed   COMP METABOLIC PANEL (14) - Abnormal; Notable for the following components:       Result Value    BUN 8 (*)     AST 95 (*)     ALT 95 (*)     Alkaline Phosphatase 30 (*)     All other components within normal limits   URINALYSIS WITH CULTURE REFLEX - Abnormal; Notable for the following components:    Clarity Urine Slightly Cloudy (*)     Bacteria Urine Rare (*)     Squamous Epi. Cells Few (*)     All other components within normal limits   UA MICROSCOPIC ONLY, URINE - Abnormal; Notable for the following components:    Bacteria Urine Rare (*)     Squamous Epi. Cells Few (*)     All other components within normal limits   HCG, BETA SUBUNIT (QUANT PREGNANCY TEST) - Normal   CBC WITH DIFFERENTIAL WITH PLATELET             US PELVIS W DOPPLER (DKH=36986/83749)    Result Date: 9/21/2024  CONCLUSION:  1. Mild-to-moderate amount of free pelvic fluid. 2. No evidence of ovarian torsion. 3.  Endometrial stripe thickening.  Correlation with menstrual phase is recommended.    LOCATION:  [facility]    Dictated by (CST): Gary Rae MD on 9/21/2024 at 1:10 PM     Finalized by (CST): Gary Rae MD on 9/21/2024 at 1:12 PM               MDM      34-year-old female presents with pain and swelling to her lower abdomen and groin area that started 3 to 4 days ago.  Patient states she had a miscarriage at the end of July when she was about 6 weeks pregnant.      Differential includes but is not limited to retained products, fibroids, ovarian cyst, torsion, cystitis, low suspicion for intra-abdominal infection such as appendicitis or diverticulitis    Labs show normal WBC, hemoglobin, LFTs and renal function.  Beta-hCG is undetectable.  LFTs are mildly elevated.  UA without evidence of UTI.    Independent interpretation of pelvic ultrasound shows no ovarian cysts or torsion.  Radiology report reviewed as above noting some mild to moderate amount of free fluid pelvic fluid.  Endometrial stripe slightly thickened.  Patient is midcycle and this would not be unusual.  No evidence for retained products noted.    Patient states she is just getting over a cold that she had last week.  Mildly elevated LFTs may be related to recent viral infection.  Instructed to follow-up with her PCP for further outpatient evaluation.    Patient instructed to follow-up with her gynecologist regarding pelvic pain and free fluid.  Return precautions discussed                           Medical Decision Making  Amount and/or Complexity of Data Reviewed  Labs: ordered. Decision-making details documented in ED Course.  Radiology: ordered and independent interpretation performed. Decision-making details documented in ED Course.    Risk  OTC drugs.        Disposition and Plan     Clinical Impression:  1. Pelvic pain    2. Elevated LFTs         Disposition:  Discharge  9/21/2024  2:20 pm    Follow-up:  Delisa Vega  [address]  [facility] [address]  [facility] [address]  [phone]    Schedule an appointment as soon as possible for a visit in 2 day(s)      Xochitl Reddy MD  [address] [place] [address]  [facility] [address]  [phone]    Schedule an appointment as soon as possible for a visit in 2 day(s)            Medications Prescribed:  Current Discharge Medication List

## 2024-10-17 ENCOUNTER — TELEPHONE (OUTPATIENT)
Facility: CLINIC | Age: 34
End: 2024-10-17

## 2024-10-17 NOTE — TELEPHONE ENCOUNTER
Newly pregnant, LMP 8/29/24. Patient states she was diagnosed with genital HSV in June, very mild her provider wasn't even sure it was HSV. Patient states she currently has an outbreak and unsure if she should take her Valtrex medication, unsure of what dosing is since she is currently at work. States she does not want to take medication unless recommended since it is very mild and tolerable and would like to know if there is a specific dosage recommendation. States the outbreak is not vaginal it is external only.   If patient is unable to answer the phone with recommendation please leave a voicemail or send [employer] message.   Patient had questions regarding transmitting HSV to baby whole pregnant. Advised patient the most worry with transmission is if an outbreak is present during delivery. Understanding verbalized. Will route to provider for advisement.

## 2024-10-17 NOTE — TELEPHONE ENCOUNTER
Patient called to ask about a medication after finding out she is newly pregnant. First OB visit scheduled for 10/28/24.

## 2024-10-18 NOTE — TELEPHONE ENCOUNTER
Patient saw message and has a couple questions  1) infection is almost gone, outbreak seemed to have only lasted about a day and a half and wondering if it is worth the risk of taking the medication.   2) would also like to know if she should take the valtrex for a full ten 10 days, unsure of mg.     Patient advised to obtain complete dosing information and I will then route questions to provider to provide most accurate recommendation. Understanding verbalized.

## 2024-10-18 NOTE — TELEPHONE ENCOUNTER
Attempted to call patient, voice mailbox full. Sent [employer] message with recommendation to take Valtrex treatment per ZI Smith.

## 2024-10-28 ENCOUNTER — OFFICE VISIT (OUTPATIENT)
Facility: CLINIC | Age: 34
End: 2024-10-28
Payer: COMMERCIAL

## 2024-10-28 ENCOUNTER — LAB ENCOUNTER (OUTPATIENT)
Dept: LAB | Facility: HOSPITAL | Age: 34
End: 2024-10-28
Attending: STUDENT IN AN ORGANIZED HEALTH CARE EDUCATION/TRAINING PROGRAM
Payer: COMMERCIAL

## 2024-10-28 ENCOUNTER — ULTRASOUND ENCOUNTER (OUTPATIENT)
Facility: CLINIC | Age: 34
End: 2024-10-28
Payer: COMMERCIAL

## 2024-10-28 VITALS
WEIGHT: 139.81 LBS | SYSTOLIC BLOOD PRESSURE: 100 MMHG | BODY MASS INDEX: 22.47 KG/M2 | HEIGHT: 66 IN | DIASTOLIC BLOOD PRESSURE: 62 MMHG | HEART RATE: 74 BPM

## 2024-10-28 DIAGNOSIS — O98.519: ICD-10-CM

## 2024-10-28 DIAGNOSIS — O20.0 THREATENED ABORTION, ANTEPARTUM (HCC): Primary | ICD-10-CM

## 2024-10-28 DIAGNOSIS — Z34.90 PREGNANCY WITH FETUS OF UNKNOWN GESTATIONAL AGE (HCC): ICD-10-CM

## 2024-10-28 DIAGNOSIS — O20.0 THREATENED ABORTION, ANTEPARTUM (HCC): ICD-10-CM

## 2024-10-28 DIAGNOSIS — B00.9: ICD-10-CM

## 2024-10-28 PROBLEM — Z86.19 HISTORY OF HERPES GENITALIS: Status: ACTIVE | Noted: 2024-10-28

## 2024-10-28 LAB — B-HCG SERPL-ACNC: ABNORMAL MIU/ML

## 2024-10-28 PROCEDURE — 36415 COLL VENOUS BLD VENIPUNCTURE: CPT

## 2024-10-28 PROCEDURE — 84702 CHORIONIC GONADOTROPIN TEST: CPT

## 2024-10-28 RX ORDER — ACYCLOVIR 800 MG/1
800 TABLET ORAL 2 TIMES DAILY
Qty: 10 TABLET | Refills: 1 | Status: SHIPPED | OUTPATIENT
Start: 2024-10-28 | End: 2024-11-02

## 2024-10-28 NOTE — PROGRESS NOTES
[facility]  Obstetrics and Gynecology   History & Physical  Gyn Problem Visit    Chief complaint:   Chief Complaint   Patient presents with    Gyn Problem       Subjective:     HPI: Desi Rodriguez is a 34 year old  with Patient's last menstrual period was 2024 (exact date).     Here for: gyn problem. Had initial OB US today which demonstrated a gestational sac and yolk sac, but no fetal pole. Patient reports some cramping and some spotting throughout knowing she was pregnant. She is currently having some bloating, nausea, breast tenderness and fatigue. Of note, she did have a miscarriage in July and a subsequent period in August. She was seen in the ED on  with abdominal pain - of note, she had a TVUS done that was normal and a blood bHCG test that was NEGATIVE.     Partner: Phillip present for visit.     Chaperone for exam was declined.    PCP: Xochitl Reddy MD   Patient Care Team:  Xochitl Reddy MD as PCP - General (Family Medicine)  Delisa Vega MD (OBSTETRICS & GYNECOLOGY)       GYN Hx:   Menarche: 14-15 (10/28/2024  3:03 PM)  Period Cycle (Days): Irregular (10/28/2024  3:03 PM)  Period Duration (Days): 5 (10/28/2024  3:03 PM)  Period Flow: normal (10/28/2024  3:03 PM)  Use of Birth Control (if yes, specify type): None (10/28/2024  3:03 PM)  Hx Prior Abnormal Pap: Yes (10/28/2024  3:03 PM)  Pap Date:  (per patient over a year ago) (2024 11:38 AM)  Pap Result Notes: -- (negative per patient) (2024 11:38 AM)  Follow Up Recommendation: Last pap smear per pt, , normal. (10/28/2024  3:03 PM)    Patient's last menstrual period was 2024 (exact date).  Hx Prior Abnormal Pap: Yes  Follow Up Recommendation: Last pap smear per pt, , normal.      Cervical cancer screening:   Last pap: , normal    Breast cancer screening:  Mammogram: age 40    Colon cancer screening:  Colonoscopy age 45     Osteoporosis screening:  DEXA scan age 65     OB  History:  OB History    Para Term  AB Living   3 1 1   1 1   SAB IAB Ectopic Multiple Live Births   1       1      # Outcome Date GA Lbr Herb/2nd Weight Sex Type Anes PTL Lv   3 SAB 2024 6w1d          2 Term 10/10/18 39w0d  7 lb 11 oz (3.487 kg) M NORMAL SPONT   RACHEL   1                 Meds:  Medications Ordered Prior to Encounter[1]    All:  Allergies[2]    PMH:  Past Medical History:   Diagnosis Date    HSV-1 infection         PSH:  Past Surgical History:   Procedure Laterality Date    Knee scope,med or lat menis repair Right        Social History:  Social History     Socioeconomic History    Marital status: Single   Tobacco Use    Smoking status: Former     Types: Cigarettes    Smokeless tobacco: Former   Vaping Use    Vaping status: Some Days   Substance and Sexual Activity    Alcohol use: Not Currently     Comment: rarely    Drug use: Never    Sexual activity: Yes     Partners: Male     Social Drivers of Health     Financial Resource Strain: Low Risk  (10/28/2024)    Financial Resource Strain     Difficulty of Paying Living Expenses: Not very hard     Med Affordability: No   Food Insecurity: No Food Insecurity (10/28/2024)    Food Insecurity     Food Insecurity: Never true   Transportation Needs: No Transportation Needs (10/28/2024)    Transportation Needs     Lack of Transportation: No   Stress: No Stress Concern Present (10/28/2024)    Stress     Feeling of Stress : No   Housing Stability: Low Risk  (10/28/2024)    Housing Stability     Housing Instability: No        Family History:  Family History   Problem Relation Age of Onset    Heart Disorder Father     Cancer Paternal Grandmother 80        breast cancer       Immunization History:  Immunization History   Administered Date(s) Administered    HPV (Gardasil) 2008, 2008, 10/07/2008    Hep A, Adult 2015    RHO(D) Immune Globulin 2024    TDAP 2015, 2024    Tb Intradermal Test 2016,  03/12/2024    Typhoid,VI Polysacharide IM 12/14/2015       Depression Scale      PHQ-2 not done in last 12 months! Please administer and refresh!        Constitutional:  Denies fatigue, night sweats, hot flashes  Eyes:  denies blurred or double vision  Cardiovascular:  denies chest pain or palpitations  Respiratory:  denies shortness of breath  Gastrointestinal:  denies heartburn, abdominal pain, diarrhea or constipation  Genitourinary:  denies dysuria, incontinence, abnormal vaginal discharge, vaginal itching  Musculoskeletal:  denies back pain.  Skin/Breast:  Denies any breast pain, lumps, or discharge.   Neurological:  denies headaches, extremity weakness or numbness.  Psychiatric: denies depression or anxiety.  Endocrine:   denies excessive thirst or urination.  Heme/Lymph:  denies history of anemia, easy bruising or bleeding.    Objective:     Vitals:    10/28/24 1501   BP: 100/62   Pulse: 74   Weight: 139 lb 12.8 oz (63.4 kg)   Height: 66\"       Body mass index is 22.56 kg/m².    Physical Exam:  Constitutional: well developed, well nourished  Head/Face: normocephalic  Abdomen:  soft, nontender, nondistended, no masses  Skin/Hair: no unusual rashes or bruises  Extremities: no edema, no cyanosis  Psychiatric:  Oriented to time, place, person and situation. Appropriate mood and affect    Pelvic Exam: deferred    Labs:  Lab Results   Component Value Date    WBC 7.1 09/21/2024    RBC 4.08 09/21/2024    HGB 13.1 09/21/2024    HCT 38.3 09/21/2024    MCV 93.9 09/21/2024    MCH 32.1 09/21/2024    MCHC 34.2 09/21/2024    RDW 12.6 09/21/2024    .0 09/21/2024        Lab Results   Component Value Date    GLU 97 09/21/2024    BUN 8 (L) 09/21/2024    CREATSERUM 0.76 09/21/2024    ANIONGAP 5 09/21/2024    CA 9.5 09/21/2024    OSMOCALC 282 09/21/2024    ALKPHO 30 (L) 09/21/2024    AST 95 (H) 09/21/2024    ALT 95 (H) 09/21/2024    BILT 0.6 09/21/2024    TP 6.8 09/21/2024    ALB 4.3 09/21/2024    GLOBULIN 2.5 09/21/2024      2024    K 4.0 2024     2024    CO2 24.0 2024       Lab Results   Component Value Date    CHOLEST 130 2024    TRIG 41 2024    HDL 51 2024    LDL 69 2024    VLDL 6 2024    NONHDLC 79 2024        Lab Results   Component Value Date    TSH 0.979 2024        No results found for: \"EAG\", \"A1C\"    Imaging:  US OB TRANSVAGINAL ANY TRIMESTER EMG ONLY    Result Date: 10/28/2024  TRANSVAGINAL ULTRASOUND HAVE BEEN PERFORMED SINGLE VIABLE IUP SEEN LMP= 2024=8W4D INTRAUTERINE GESTATIONAL SAC(MSD=1.53 CM) SEEN WITH YOLK SAC .FETAL POLE NOT VSEEN YOLK SAC SEEN OVARIES ARE SEEN CERVIX CLOSED SMALL ANECHOIC AREA SEEN CLOSE TO GS (0.3X0.4CM)    US PELVIS W DOPPLER (TWE=46090/85985)    Result Date: 2024  CONCLUSION:  1. Mild-to-moderate amount of free pelvic fluid. 2. No evidence of ovarian torsion. 3. Endometrial stripe thickening.  Correlation with menstrual phase is recommended.    LOCATION:  [facility]    Dictated by (CST): Gary Rae MD on 2024 at 1:10 PM     Finalized by (CST): Gary Rae MD on 2024 at 1:12 PM           Assessment and Plan:     Desi Rodriguez is a 34 year old  female here for problem visit: threatened AB.      Diagnoses and all orders for this visit:    Threatened , antepartum (HCC)  -     HCG, Beta Subunit (Quant Pregnancy Test); Future  -     HCG, Beta Subunit (Quant Pregnancy Test); Future  -     US OB TRANSVAGINAL ANY TRIMESTER EMG ONLY; Future    Herpes virus infection in mother during pregnancy, antepartum (HCC)  -     acyclovir 800 MG Oral Tab; Take 1 tablet (800 mg total) by mouth 2 (two) times daily for 5 days.    Pregnancy with fetus of unknown gestational age (HCC)  -     US OB TRANSVAGINAL ANY TRIMESTER EMG ONLY; Future       #Threatened AB  - some spotting and cramping   - GS + YS visualized on TVUS today but no fetal pole   - of note, pt with ED visit on  with  NEGATIVE bHCG blood work, thus likely she is earlier than LMP suggests  - plan bHCG x 2 and repeat US in 11 days (per ACOG guidelines to diagnose viable pregnancy)   - continue PNV and magnesium    #Herpes in pregnancy  - acyclovir Rx provided  - pt advised that only needs suppressive therapy starting at 36 wks, otherwise just needs treatment for acute episodes      RTC 2 weeks for repeat US and initial OB visit    Yelena Angela MD  [facility] - OBGYN    Note to patient and family:  The 21st Century Cures Act makes medical notes available to patients in the interest of transparency.  However, please be advised that this is a medical document.  It is intended as a peer to peer communication.  It is written in medical language and may contain abbreviations or verbiage that are technical and unfamiliar.  It may appear blunt or direct.  Medical documents are intended to carry relevant information, facts as evident, and the clinical opinion of the practitioner.         [1]   Current Outpatient Medications on File Prior to Visit   Medication Sig Dispense Refill    prenatal vitamin with DHA 27-0.8-228 MG Oral Cap Take 1 capsule by mouth daily.      Magnesium 100 MG Oral Tab Take 1.2 tablets (120 mg total) by mouth.       No current facility-administered medications on file prior to visit.   [2] No Known Allergies

## 2024-10-30 ENCOUNTER — LAB ENCOUNTER (OUTPATIENT)
Dept: LAB | Facility: HOSPITAL | Age: 34
End: 2024-10-30
Attending: STUDENT IN AN ORGANIZED HEALTH CARE EDUCATION/TRAINING PROGRAM
Payer: COMMERCIAL

## 2024-10-30 ENCOUNTER — TELEPHONE (OUTPATIENT)
Facility: CLINIC | Age: 34
End: 2024-10-30

## 2024-10-30 LAB — B-HCG SERPL-ACNC: 9912.1 MIU/ML

## 2024-10-30 PROCEDURE — 84702 CHORIONIC GONADOTROPIN TEST: CPT

## 2024-10-30 PROCEDURE — 36415 COLL VENOUS BLD VENIPUNCTURE: CPT

## 2024-10-30 NOTE — TELEPHONE ENCOUNTER
Unable to leave a message for the patient to call back regarding her SDOH questionnaire. Mailbox is full.

## 2024-11-11 ENCOUNTER — ULTRASOUND ENCOUNTER (OUTPATIENT)
Facility: CLINIC | Age: 34
End: 2024-11-11
Payer: COMMERCIAL

## 2024-11-13 ENCOUNTER — TELEPHONE (OUTPATIENT)
Dept: OBGYN CLINIC | Facility: CLINIC | Age: 34
End: 2024-11-13

## 2024-11-13 NOTE — TELEPHONE ENCOUNTER
Telephone Call      Called patient to update on TVUS results. Patient reports she did pass the pregnancy shortly after she saw me in clinic. US on 11/11 showed no IUP visualized (previous had GS). No color flow in endometrium. Patient says she feels some discomfort in her pelvic region but not necessarily pain. Denies fevers, chills, severe pelvic pain, abnormal vaginal discharge. Has appt with us on 11/27 for follow up: wants to ensure that everything is normal after her miscarriage due to the discomfort she is feeling. I discussed that since this is patient's second miscarriage, can consider work-up for recurrent pregnancy loss, although patient did have a normal pregnancy in 2018. Will likely wait a few months before TTC again .    All her questions were answered - plan follow up in clinic post miscarriage.

## 2024-11-14 NOTE — TELEPHONE ENCOUNTER
Unable to leave a message for the patient to call back regarding her SDOH questionnaire. Mailbox is full. SDOH resources set through her [employer].

## 2024-11-27 ENCOUNTER — OFFICE VISIT (OUTPATIENT)
Dept: OBGYN CLINIC | Facility: CLINIC | Age: 34
End: 2024-11-27
Payer: COMMERCIAL

## 2024-11-27 VITALS
SYSTOLIC BLOOD PRESSURE: 109 MMHG | DIASTOLIC BLOOD PRESSURE: 69 MMHG | BODY MASS INDEX: 22.08 KG/M2 | HEIGHT: 66 IN | WEIGHT: 137.38 LBS | HEART RATE: 64 BPM

## 2024-11-27 DIAGNOSIS — N91.4 SECONDARY OLIGOMENORRHEA: ICD-10-CM

## 2024-11-27 DIAGNOSIS — N96 RECURRENT PREGNANCY LOSS: Primary | ICD-10-CM

## 2024-11-27 PROCEDURE — 3078F DIAST BP <80 MM HG: CPT | Performed by: STUDENT IN AN ORGANIZED HEALTH CARE EDUCATION/TRAINING PROGRAM

## 2024-11-27 PROCEDURE — 99214 OFFICE O/P EST MOD 30 MIN: CPT | Performed by: STUDENT IN AN ORGANIZED HEALTH CARE EDUCATION/TRAINING PROGRAM

## 2024-11-27 PROCEDURE — 3074F SYST BP LT 130 MM HG: CPT | Performed by: STUDENT IN AN ORGANIZED HEALTH CARE EDUCATION/TRAINING PROGRAM

## 2024-11-27 PROCEDURE — 3008F BODY MASS INDEX DOCD: CPT | Performed by: STUDENT IN AN ORGANIZED HEALTH CARE EDUCATION/TRAINING PROGRAM

## 2024-11-27 RX ORDER — MULTIVIT-MIN/IRON/FOLIC ACID/K 18-600-40
CAPSULE ORAL
COMMUNITY

## 2024-11-27 NOTE — PATIENT INSTRUCTIONS
-blood tests: ideally to be drawn around day 2-5 of your next period (day 1 = first day of bleeding), but if periods are irregular can get drawn anytime. First thing in AM before breakfast (fasting)    -hysterosonogram: done in our office - we will call you to schedule, if you don't hear from us by next week can call. To be scheduled ideally around cycle day 8-10 (so after you finish a period but before you ovulate)

## 2024-11-27 NOTE — PROGRESS NOTES
[facility]  Obstetrics and Gynecology   Established Patient Visit    Chief complaint:   Chief Complaint   Patient presents with    Follow - Up     -Recent miscarriage   -Patient reports \"some discomfort\" in the abdomen area for about one month        HPI: Desi Rodriguez is a 34 year old  with Patient's last menstrual period was 2024 (exact date).      Pt seen for initial OB US 10/28/24, no cardiac activity, pt then ended up miscarrying and passing pregnancy on , her follow up US done 24 showed empty uterus with up to 6mm endometrial stripe and no color flow. Of note US did demonstrate enlarged polycystic-appearing ovaries  By the time she had that ultrasound had stopped bleeding after SAB  She has not had a period again yet  Periods are pretty irregular actually, usually at least 1 to a few weeks late  No abdominal pain but \"feels off\" in pelvis - not painful but just \"not totally normal\"  Pt had one additional SAB prior to this in 2024, also early first trimester    Meds:  Medications Ordered Prior to Encounter[1]      PHYSICAL EXAM:     Vitals:    24 1112   BP: 109/69   Pulse: 64   Weight: 137 lb 6.4 oz (62.3 kg)   Height: 66\"       Body mass index is 22.18 kg/m².    Exam:  General: NAD, appears well  Pelvic exam: deferred    Assessment & Plan:     Desi Rodriguez is a 34 year old  female here for      Diagnoses and all orders for this visit:    Recurrent pregnancy loss  -     Lupus Anticoagulant/Antiphospholipid Panel; Future  -     Hemoglobin A1C; Future  -     TSH and Free T4; Future  -     Sonohysterogram GYNE Only [20043/63339]; Future    Secondary oligomenorrhea  -     Anti-Müllerian Hormone (AMH) ([employer]); Future  -     Dehydroepiandrosterone Sulfate; Future  -     Estradiol; Future  -     FSH; Future  -     Hemoglobin A1C; Future  -     Prolactin; Future  -     Testosterone,Total and Weakly Bound w/ SHBG; Future  -     TSH and Free T4;  Future  -     17-OH-Progesterone; Future         Pt would like to pursue recurrent pregnancy loss workup  Would also like to do PCOS labs due to her irregular menses and polycystic-appearing ovaries by US  Will message our clinic to schedule her for HSN  If all testing is negative can also consider karyotype  Also advised pt that cause of SAB can be age-related increase in aneuploid embryos, for this the only treatment option available to test embryos would be IVF with PGT but that is not something that she'd NEED to pursue, is definitely possible to conceive a normal pregnancy in the near future as well        Sherley Mckay MD  [facility] - OBGYN             [1]   Current Outpatient Medications on File Prior to Visit   Medication Sig Dispense Refill    Cholecalciferol (VITAMIN D) 50 MCG (2000 UT) Oral Cap Take by mouth. Once a Week      prenatal vitamin with DHA 27-0.8-228 MG Oral Cap Take 1 capsule by mouth daily.      Magnesium 100 MG Oral Tab Take 1.2 tablets (120 mg total) by mouth.       No current facility-administered medications on file prior to visit.      0 = independent

## 2024-12-07 ENCOUNTER — LAB ENCOUNTER (OUTPATIENT)
Dept: LAB | Facility: HOSPITAL | Age: 34
End: 2024-12-07
Attending: STUDENT IN AN ORGANIZED HEALTH CARE EDUCATION/TRAINING PROGRAM
Payer: COMMERCIAL

## 2024-12-07 DIAGNOSIS — N91.4 SECONDARY OLIGOMENORRHEA: ICD-10-CM

## 2024-12-07 DIAGNOSIS — N96 RECURRENT PREGNANCY LOSS: ICD-10-CM

## 2024-12-07 LAB
DHEA-S SERPL-MCNC: 238.4 UG/DL
EST. AVERAGE GLUCOSE BLD GHB EST-MCNC: 108 MG/DL (ref 68–126)
ESTRADIOL SERPL-MCNC: 99.3 PG/ML
FSH SERPL-ACNC: 2 MIU/ML
HBA1C MFR BLD: 5.4 % (ref ?–5.7)
PROLACTIN SERPL-MCNC: 5.7 NG/ML
T4 FREE SERPL-MCNC: 1.2 NG/DL (ref 0.8–1.7)
TSI SER-ACNC: 1.74 UIU/ML (ref 0.55–4.78)

## 2024-12-07 PROCEDURE — 86147 CARDIOLIPIN ANTIBODY EA IG: CPT

## 2024-12-07 PROCEDURE — 86146 BETA-2 GLYCOPROTEIN ANTIBODY: CPT

## 2024-12-07 PROCEDURE — 85613 RUSSELL VIPER VENOM DILUTED: CPT

## 2024-12-07 PROCEDURE — 82627 DEHYDROEPIANDROSTERONE: CPT

## 2024-12-07 PROCEDURE — 84443 ASSAY THYROID STIM HORMONE: CPT

## 2024-12-07 PROCEDURE — 36415 COLL VENOUS BLD VENIPUNCTURE: CPT

## 2024-12-07 PROCEDURE — 83520 IMMUNOASSAY QUANT NOS NONAB: CPT

## 2024-12-07 PROCEDURE — 83001 ASSAY OF GONADOTROPIN (FSH): CPT

## 2024-12-07 PROCEDURE — 82670 ASSAY OF TOTAL ESTRADIOL: CPT

## 2024-12-07 PROCEDURE — 84410 TESTOSTERONE BIOAVAILABLE: CPT

## 2024-12-07 PROCEDURE — 83036 HEMOGLOBIN GLYCOSYLATED A1C: CPT

## 2024-12-07 PROCEDURE — 84146 ASSAY OF PROLACTIN: CPT

## 2024-12-07 PROCEDURE — 85732 THROMBOPLASTIN TIME PARTIAL: CPT

## 2024-12-07 PROCEDURE — 85705 THROMBOPLASTIN INHIBITION: CPT

## 2024-12-07 PROCEDURE — 84439 ASSAY OF FREE THYROXINE: CPT

## 2024-12-07 PROCEDURE — 84143 ASSAY OF 17-HYDROXYPREGNENO: CPT

## 2024-12-07 PROCEDURE — 85610 PROTHROMBIN TIME: CPT

## 2024-12-10 LAB
APTT PPP: 29.4 SECONDS (ref 23–36)
B2 GLYCOPROT1 IGG SERPL IA-ACNC: <0.8 U/ML (ref ?–7)
B2 GLYCOPROT1 IGM SERPL IA-ACNC: 3.1 U/ML (ref ?–7)
CARDIOLIPIN IGG SERPL-ACNC: 1 GPL (ref ?–10)
CARDIOLIPIN IGM SERPL-ACNC: 8.6 MPL (ref ?–10)
INR BLD: 0.94 (ref 0.85–1.16)
LA 3 SCREEN W REFLEX-IMP: NEGATIVE
PROTHROMBIN TIME: 12.6 SECONDS (ref 11.6–14.8)
SCREEN DRVVT: 1.03 S (ref 0–1.29)
SCREEN DRVVT: NEGATIVE S
STACLOT LA DELTA: 3 SECONDS (ref ?–8)

## 2024-12-11 LAB — 17-OH PROGESTERONE: 65 NG/DL

## 2024-12-12 LAB
SEX HORM BIND GLOB: 52.9 NMOL/L
TESTOST % FREE+WEAK BND: 12.7 %
TESTOST FREE+WEAK BND: 2.3 NG/DL
TESTOSTERONE TOT /MS: 18.4 NG/DL

## 2024-12-16 LAB — MULLERIAN AMH: 5.49 NG/ML

## 2025-02-17 DIAGNOSIS — O98.519: ICD-10-CM

## 2025-02-17 DIAGNOSIS — B00.9: ICD-10-CM

## 2025-02-18 RX ORDER — ACYCLOVIR 800 MG/1
TABLET ORAL
Qty: 10 TABLET | Refills: 0 | Status: SHIPPED | OUTPATIENT
Start: 2025-02-18

## 2025-08-05 PROBLEM — R55 SYNCOPE AND COLLAPSE: Status: RESOLVED | Noted: 2024-06-19 | Resolved: 2025-08-05

## 2025-08-05 PROBLEM — R55 SYNCOPE AND COLLAPSE: Status: ACTIVE | Noted: 2024-06-19

## 2025-08-05 PROBLEM — Z34.90: Status: RESOLVED | Noted: 2024-10-28 | Resolved: 2025-08-05

## 2025-08-05 PROBLEM — R00.0 RACING HEART BEAT: Status: RESOLVED | Noted: 2024-06-19 | Resolved: 2025-08-05

## 2025-08-05 PROBLEM — R07.9 CHEST PAIN, UNSPECIFIED: Status: ACTIVE | Noted: 2024-06-19

## 2025-08-05 PROBLEM — R00.0 RACING HEART BEAT: Status: ACTIVE | Noted: 2024-06-19

## 2025-08-06 ENCOUNTER — ULTRASOUND ENCOUNTER (OUTPATIENT)
Facility: CLINIC | Age: 35
End: 2025-08-06

## 2025-08-06 ENCOUNTER — TELEPHONE (OUTPATIENT)
Facility: CLINIC | Age: 35
End: 2025-08-06

## 2025-08-06 ENCOUNTER — INITIAL PRENATAL (OUTPATIENT)
Facility: CLINIC | Age: 35
End: 2025-08-06

## 2025-08-06 VITALS
BODY MASS INDEX: 21.8 KG/M2 | HEART RATE: 68 BPM | DIASTOLIC BLOOD PRESSURE: 68 MMHG | SYSTOLIC BLOOD PRESSURE: 102 MMHG | HEIGHT: 66 IN | WEIGHT: 135.63 LBS

## 2025-08-06 DIAGNOSIS — Z86.19 HISTORY OF HERPES GENITALIS: ICD-10-CM

## 2025-08-06 DIAGNOSIS — O36.80X0 PREGNANCY WITH UNCERTAIN FETAL VIABILITY, SINGLE OR UNSPECIFIED FETUS (HCC): Primary | ICD-10-CM

## 2025-08-06 DIAGNOSIS — O36.80X0 PREGNANCY WITH UNCERTAIN FETAL VIABILITY, SINGLE OR UNSPECIFIED FETUS (HCC): ICD-10-CM

## 2025-08-06 DIAGNOSIS — O36.5910: Primary | ICD-10-CM

## 2025-08-06 DIAGNOSIS — O26.21: ICD-10-CM

## 2025-08-06 PROBLEM — Z34.81 PRENATAL CARE, SUBSEQUENT PREGNANCY IN FIRST TRIMESTER (HCC): Status: ACTIVE | Noted: 2025-08-06

## 2025-08-06 PROBLEM — R07.9 CHEST PAIN, UNSPECIFIED: Status: RESOLVED | Noted: 2024-06-19 | Resolved: 2025-08-06

## 2025-08-06 LAB
APPEARANCE: CLEAR
BILIRUBIN: NEGATIVE
GLUCOSE (URINE DIPSTICK): NEGATIVE MG/DL
KETONES (URINE DIPSTICK): NEGATIVE MG/DL
LEUKOCYTES: NEGATIVE
MULTISTIX LOT#: NORMAL NUMERIC
NITRITE, URINE: NEGATIVE
OCCULT BLOOD: NEGATIVE
PH, URINE: 5.5 (ref 4.5–8)
PROTEIN (URINE DIPSTICK): NEGATIVE MG/DL
SPECIFIC GRAVITY: 1.01 (ref 1–1.03)
URINE-COLOR: YELLOW
UROBILINOGEN,SEMI-QN: 0.2 MG/DL (ref 0–1.9)

## 2025-08-06 PROCEDURE — 81002 URINALYSIS NONAUTO W/O SCOPE: CPT | Performed by: OBSTETRICS & GYNECOLOGY

## 2025-08-06 PROCEDURE — 76817 TRANSVAGINAL US OBSTETRIC: CPT | Performed by: OBSTETRICS & GYNECOLOGY

## 2025-08-06 PROCEDURE — 3074F SYST BP LT 130 MM HG: CPT | Performed by: OBSTETRICS & GYNECOLOGY

## 2025-08-06 PROCEDURE — 3008F BODY MASS INDEX DOCD: CPT | Performed by: OBSTETRICS & GYNECOLOGY

## 2025-08-06 PROCEDURE — 3078F DIAST BP <80 MM HG: CPT | Performed by: OBSTETRICS & GYNECOLOGY

## 2025-08-06 RX ORDER — VALACYCLOVIR HYDROCHLORIDE 1 G/1
TABLET, FILM COATED ORAL
COMMUNITY
Start: 2025-06-26

## 2025-08-07 ENCOUNTER — TELEPHONE (OUTPATIENT)
Facility: CLINIC | Age: 35
End: 2025-08-07

## 2025-08-07 DIAGNOSIS — O36.80X0 PREGNANCY WITH UNCERTAIN FETAL VIABILITY, SINGLE OR UNSPECIFIED FETUS (HCC): ICD-10-CM

## 2025-08-07 DIAGNOSIS — N96 RECURRENT PREGNANCY LOSS: Primary | ICD-10-CM

## 2025-08-12 ENCOUNTER — LAB ENCOUNTER (OUTPATIENT)
Dept: LAB | Facility: HOSPITAL | Age: 35
End: 2025-08-12
Attending: OBSTETRICS & GYNECOLOGY

## 2025-08-12 ENCOUNTER — NURSE ONLY (OUTPATIENT)
Facility: CLINIC | Age: 35
End: 2025-08-12

## 2025-08-12 VITALS
HEART RATE: 58 BPM | HEIGHT: 66 IN | SYSTOLIC BLOOD PRESSURE: 112 MMHG | WEIGHT: 137 LBS | BODY MASS INDEX: 22.02 KG/M2 | DIASTOLIC BLOOD PRESSURE: 66 MMHG

## 2025-08-12 DIAGNOSIS — N96 RECURRENT PREGNANCY LOSS: ICD-10-CM

## 2025-08-12 DIAGNOSIS — O26.899 RH NEGATIVE STATE IN ANTEPARTUM PERIOD (HCC): Primary | ICD-10-CM

## 2025-08-12 DIAGNOSIS — O36.80X0 PREGNANCY WITH UNCERTAIN FETAL VIABILITY, SINGLE OR UNSPECIFIED FETUS (HCC): ICD-10-CM

## 2025-08-12 DIAGNOSIS — O36.5910: ICD-10-CM

## 2025-08-12 DIAGNOSIS — Z67.91 RH NEGATIVE STATE IN ANTEPARTUM PERIOD (HCC): Primary | ICD-10-CM

## 2025-08-12 DIAGNOSIS — O26.21: ICD-10-CM

## 2025-08-12 LAB
ANTIBODY SCREEN: POSITIVE
B-HCG SERPL-ACNC: 1183.1 MIU/ML (ref ?–4.2)
PROGEST SERPL-MCNC: 0.91 NG/ML
RH BLOOD TYPE: NEGATIVE

## 2025-08-12 PROCEDURE — 3008F BODY MASS INDEX DOCD: CPT

## 2025-08-12 PROCEDURE — 86900 BLOOD TYPING SEROLOGIC ABO: CPT

## 2025-08-12 PROCEDURE — 84702 CHORIONIC GONADOTROPIN TEST: CPT

## 2025-08-12 PROCEDURE — 86901 BLOOD TYPING SEROLOGIC RH(D): CPT

## 2025-08-12 PROCEDURE — 88237 TISSUE CULTURE BONE MARROW: CPT

## 2025-08-12 PROCEDURE — 96372 THER/PROPH/DIAG INJ SC/IM: CPT

## 2025-08-12 PROCEDURE — 36415 COLL VENOUS BLD VENIPUNCTURE: CPT

## 2025-08-12 PROCEDURE — 86870 RBC ANTIBODY IDENTIFICATION: CPT

## 2025-08-12 PROCEDURE — 3074F SYST BP LT 130 MM HG: CPT

## 2025-08-12 PROCEDURE — 88262 CHROMOSOME ANALYSIS 15-20: CPT

## 2025-08-12 PROCEDURE — 3078F DIAST BP <80 MM HG: CPT

## 2025-08-12 PROCEDURE — 86850 RBC ANTIBODY SCREEN: CPT

## 2025-08-12 PROCEDURE — 84144 ASSAY OF PROGESTERONE: CPT

## 2025-08-13 ENCOUNTER — RESULTS FOLLOW-UP (OUTPATIENT)
Facility: CLINIC | Age: 35
End: 2025-08-13

## 2025-08-13 DIAGNOSIS — O03.9 MISCARRIAGE (HCC): Primary | ICD-10-CM

## 2025-08-15 ENCOUNTER — ULTRASOUND ENCOUNTER (OUTPATIENT)
Facility: CLINIC | Age: 35
End: 2025-08-15

## (undated) NOTE — LETTER
March 14, 2024    Desi Rodriguez  [address] [place] [address]      Dear Desi:    The following are the results of your recent tests. Please review the list of test results.  Your result is the value on the left; we have also supplied the range of normal (low and high) values.    Results for orders placed or performed in visit on 03/12/24   TB test, PPD/Tubersol/Aplisol [33229] (Dx Z11.1)   Result Value Ref Range    Date Given: 3/12/24     Site: l forearm     INDURATION (PPD) 0.0 0.0 - 11 mm     This is a negative TB test result.    Please call if you have further questions,    WILLIE Macdonald  Board Certified Family Nurse Practitioner  [place] Licence # [id number]

## (undated) NOTE — LETTER
Date: 9/21/2024    Patient Name: Desi Rodriguez          To Whom it may concern:    This letter has been written at the patient's request. The above patient was seen at [facility] for treatment of a medical condition.    This patient was recommended for further evaluation and treatment based on today's visit.        Sincerely,        WILLIE Ewing